# Patient Record
Sex: MALE | Race: WHITE | NOT HISPANIC OR LATINO | Employment: OTHER | URBAN - METROPOLITAN AREA
[De-identification: names, ages, dates, MRNs, and addresses within clinical notes are randomized per-mention and may not be internally consistent; named-entity substitution may affect disease eponyms.]

---

## 2017-01-24 RX ORDER — SIMVASTATIN 40 MG
40 TABLET ORAL DAILY
COMMUNITY
Start: 2013-10-25

## 2017-01-24 RX ORDER — CLONAZEPAM 0.5 MG/1
1.5 TABLET ORAL 2 TIMES DAILY
COMMUNITY
Start: 2013-11-13

## 2017-01-24 RX ORDER — DONEPEZIL HYDROCHLORIDE 10 MG/1
10 TABLET, FILM COATED ORAL DAILY
COMMUNITY
Start: 2013-11-05

## 2017-01-24 RX ORDER — CARBAMAZEPINE 400 MG/1
400 TABLET, EXTENDED RELEASE ORAL 2 TIMES DAILY
COMMUNITY
Start: 2013-11-05

## 2017-01-28 ENCOUNTER — OFFICE VISIT (OUTPATIENT)
Dept: NEUROSURGERY | Facility: CLINIC | Age: 67
End: 2017-01-28

## 2017-01-28 ENCOUNTER — PREP FOR SURGERY (OUTPATIENT)
Dept: NEUROSURGERY | Facility: CLINIC | Age: 67
End: 2017-01-28

## 2017-01-28 VITALS — WEIGHT: 225.4 LBS | HEIGHT: 69 IN | TEMPERATURE: 97.4 F | BODY MASS INDEX: 33.38 KG/M2

## 2017-01-28 DIAGNOSIS — G40.909 SEIZURE DISORDER (HCC): Primary | ICD-10-CM

## 2017-01-28 PROCEDURE — 99243 OFF/OP CNSLTJ NEW/EST LOW 30: CPT | Performed by: NEUROLOGICAL SURGERY

## 2017-01-28 RX ORDER — FAMOTIDINE 10 MG
20 TABLET ORAL
Status: CANCELLED | OUTPATIENT
Start: 2017-01-28

## 2017-01-28 RX ORDER — CHLORHEXIDINE GLUCONATE 4 G/100ML
SOLUTION TOPICAL
Qty: 120 ML | Refills: 0 | Status: SHIPPED | OUTPATIENT
Start: 2017-01-28 | End: 2017-02-16 | Stop reason: HOSPADM

## 2017-01-28 RX ORDER — SODIUM CHLORIDE 0.9 % (FLUSH) 0.9 %
1-10 SYRINGE (ML) INJECTION AS NEEDED
Status: CANCELLED | OUTPATIENT
Start: 2017-01-28

## 2017-01-28 NOTE — MR AVS SNAPSHOT
Harsha Delaney   2017 8:15 AM   Office Visit    Dept Phone:  [phone]   Encounter #:  [id number]    Provider:  Christiano Lopez MD   Department:  [facility]                Your Full Care Plan              Your Updated Medication List          This list is accurate as of: 17  8:39 AM.  Always use your most recent med list.                carBAMazepine  MG 12 hr tablet   Commonly known as:  TEGretol  XR       clonazePAM 0.5 MG tablet   Commonly known as:  KlonoPIN       donepezil 10 MG tablet   Commonly known as:  ARICEPT       simvastatin 40 MG tablet   Commonly known as:  ZOCOR               You Were Diagnosed With        Codes Comments    Seizure disorder    -  Primary ICD-10-CM: G40.909  ICD-9-CM: 345.90       Instructions     None    Patient Instructions History      Upcoming Appointments     Visit Type Date Time Department    NEW PATIENT 2017  8:15 AM [facility]      [employer] Signup     [facility] [employer] allows you to send messages to your doctor, view your test results, renew your prescriptions, schedule appointments, and more. To sign up, go to [employer] and click on the Sign Up Now link in the New User? box. Enter your [employer] Activation Code exactly as it appears below along with the last four digits of your Social Security Number and your Date of Birth () to complete the sign-up process. If you do not sign up before the expiration date, you must request a new code.    [employer] Activation Code: [email or web address]  Expires: 2017  8:36 AM    If you have questions, you can email [email or web address] or call [phone] to talk to our [employer] staff. Remember, [employer] is NOT to be used for urgent needs. For medical emergencies, dial 911.               Other Info from Your Visit           Allergies     No Known Allergies      Reason for Visit     Seizures VAGUS NERVE STIMULATOR  "BATTERY REPLACEMENT      Vital Signs     Temperature Height Weight Body Mass Index Smoking Status       97.4 °F (36.3 °C) 69\" (175.3 cm) 225 lb 6.4 oz (102 kg) 33.29 kg/m2 Never Smoker       Problems and Diagnoses Noted     Seizure disorder    -  Primary        "

## 2017-01-28 NOTE — H&P
Patient: Harsha Delaney  : 1950     Primary Care Provider: Jessie Simons MD     Requesting Provider: As above           History     Chief Complaint:   1. Seizure disorder.  2. Nonfunctioning vagal nerve stimulator.     History of Present Illness: Mr. 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 He's had lifelong seizures. His seizures are apparently complex partial seizures that occur at night. These occur several times a week. They are thought to last for only a minute or 2. He tenses up or stiffens with the seizures. He has had 2 prior craniotomies for temporal lobe resections. A vagal nerve stimulator was placed in 2007 at the [facility]. The battery was changed out in 2012. Apparently the unit is nonfunctional now. Records from his neurologist suggest that lead impedances are high.     Review of Systems   Neurological: Positive for seizures.   All other systems reviewed and are negative.        The patient's past medical history, past surgical history, family history, and social history have been reviewed at length in the electronic medical record.     Past Medical History   Diagnosis Date   • Hypertension    • Seizures    • Shingles      Past Surgical History   Procedure Laterality Date   • Elbow procedure     • Knee cartilage surgery       Right   • Vagus nerve stimulator implantation     • Lumbar discectomy  2008     Microendoscopic far lateral dickectomy Left L3-4 (Bean)   • Posterior lumbar/thoracic spine fusion  10/24/2008     TLIF Left w percutaneous pedicle screw fixation L3/4 (Octaviano)   • Craniotomy  2002     Right temporal lobectomy (Dr. Perez)    • Brain surgery  2007     VNS Implantation (Dr. Perez) [facility]   • Craniotomy  2010     Temporal lobectomy (Dr. Perez)    • Vagus nerve stimulator generator change  2012     (Dr. Perez)    • Cervical discectomy  1989     Dr. Chavira   • Carpal tunnel release   "11/09/2009     Left (Dr. Torres)   • Carpal tunnel release  12/02/2009     Right (Dr. Torres)   • Pacemaker implantation  07/23/2013     Dr. Kent   • Cataract extraction  10/16/2013     Godfrey   • Cataract extraction     • Coronary angioplasty  10/23/2013     Dr. Kent     No family history on file.  Social History     Social History   • Marital status:      Spouse name: N/A   • Number of children: N/A   • Years of education: N/A     Occupational History   • Not on file.     Social History Main Topics   • Smoking status: Never Smoker   • Smokeless tobacco: Not on file   • Alcohol use No   • Drug use: Not on file   • Sexual activity: Not on file     Other Topics Concern   • Not on file     Social History Narrative     No Known Allergies    Physical Exam:        Visit Vitals   • Temp 97.4 °F (36.3 °C)   • Ht 69\" (175.3 cm)   • Wt 225 lb 6.4 oz (102 kg)   • BMI 33.29 kg/m2      CONSTITUTIONAL: Patient is well-nourished, pleasant and appears stated age.  CV: Heart regular rate and rhythm without murmur, rub, or gallop.  PULMONARY: Lungs are clear to ascultation.  MUSCULOSKELETAL:  Neck tenderness to palpation is not observed.   ROM in neck is normal.  His VNS battery sits on his mid left chest fairly low.  A pacemaker is present on the right side.  NEUROLOGICAL:  Orientation, memory, attention span, language function, and cognition have been examined and are intact.  Strength is intact in the upper and lower extremities to direct testing.  Muscle tone is normal throughout.  Station and gait are normal.  Sensation is intact to light touch testing throughout.  Deep tendon reflexes are 2+ and symmetrical. Viridiana's Sign is negative bilaterally. No clonus is elicited.  Coordination is intact.  CRANIAL NERVES:  Cranial Nerve II: Visual fields are full to confrontation.  Cranial Nerve III, IV, and VI: PERRLADC. Extraocular movements are intact. Nystagmus is not present.  Cranial Nerve V: Facial sensation is " intact to light touch.  Cranial Nerve VII: Muscles of facial expression demonstate no weakness or asymmetry.  Cranial Nerve VIII: Hearing is intact to finger rub bilaterally.  Cranial Nerve IX and X: Palate elevates symmetrically.  Cranial Nerve XI: Shoulder shrug is intact bilaterally.  Cranial Nerve XII: Tongue is midline without evidence of atrophy or fasciculation.     Medical Decision Making     Data Review:   I have reviewed records from his neurologist.     Diagnosis:   1. Nonfunctioning vagal nerve stimulator.  2. Complex partial seizure disorder.     Treatment Options:   The patient is here for consideration of vagal nerve stimulator battery change out and revision. Obviously the battery change out is a fairly straightforward measure. The revision of the lead is potentially more complicated with higher risk given the scar tissue present from prior interventions. The goal of surgery would be to get a functioning VNS system in place. Simple battery change out would be a more simple endeavor. If there is lead malfunction then we may need to change out the battery and lead. The battery may also need to be repositioned given its current location. Should we need to change out the lead then there is a higher risk of vagus nerve injury with resultant difficulties or the risk of substantial bleeding due to vascular injury. The nature of the procedure as well as the potential risks, complications, limitations, and alternatives to the procedure were discussed at length with the patient and the patient has agreed to proceed with surgery. Given some of his cardiac issues formal cardiac clearance will be necessary before proceeding with the surgery.          Diagnosis Plan   1. Seizure disorder

## 2017-01-28 NOTE — LETTER
"2017     Jessie Simons MD  [address] [address]    Patient: Harsha Delaney   YOB: 1950   Date of Visit: 2017       Dear Dr. Ronak MD:    Harsha Delaney was in my office today. Below is a copy of my note.    If you have questions, please do not hesitate to call me. I look forward to following Harsha along with you.         Sincerely,        Christiano Lopez MD        CC: MD Joo Fierro MD    Patient: Harsha Delaney  : 1950    Primary Care Provider: Jessie Simons MD    Requesting Provider: As above        History    Chief Complaint:   1.  Seizure disorder.  2.  Nonfunctioning vagal nerve stimulator.    History of Present Illness: Mr. 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  He's had lifelong seizures.  His seizures are apparently complex partial seizures that occur at night.  These occur several times a week.  They are thought to last for only a minute or 2.  He tenses up or stiffens with the seizures.  He has had 2 prior craniotomies for temporal lobe resections.  A vagal nerve stimulator was placed in 2007 at the [facility].  The battery was changed out in 2012.  Apparently the unit is nonfunctional now.  Records from his neurologist suggest that lead impedances are high.    Review of Systems   Neurological: Positive for seizures.   All other systems reviewed and are negative.      The patient's past medical history, past surgical history, family history, and social history have been reviewed at length in the electronic medical record.    Physical Exam:   Visit Vitals   • Temp 97.4 °F (36.3 °C)   • Ht 69\" (175.3 cm)   • Wt 225 lb 6.4 oz (102 kg)   • BMI 33.29 kg/m2     CONSTITUTIONAL: Patient is well-nourished, pleasant and appears stated age.  CV: Heart regular rate and rhythm without murmur, rub, or gallop.  PULMONARY: Lungs are clear to " ascultation.  MUSCULOSKELETAL:  Neck tenderness to palpation is not observed.   ROM in neck is normal.  His VNS battery sits on his mid left chest fairly low.  A pacemaker is present on the right side.  NEUROLOGICAL:  Orientation, memory, attention span, language function, and cognition have been examined and are intact.  Strength is intact in the upper and lower extremities to direct testing.  Muscle tone is normal throughout.  Station and gait are normal.  Sensation is intact to light touch testing throughout.  Deep tendon reflexes are 2+ and symmetrical.  Viridiana's Sign is negative bilaterally. No clonus is elicited.  Coordination is intact.  CRANIAL NERVES:  Cranial Nerve II:  Visual fields are full to confrontation.  Cranial Nerve III, IV, and VI: PERRLADC. Extraocular movements are intact.  Nystagmus is not present.  Cranial Nerve V: Facial sensation is intact to light touch.  Cranial Nerve VII: Muscles of facial expression demonstate no weakness or asymmetry.  Cranial Nerve VIII: Hearing is intact to finger rub bilaterally.  Cranial Nerve IX and X: Palate elevates symmetrically.  Cranial Nerve XI: Shoulder shrug is intact bilaterally.  Cranial Nerve XII: Tongue is midline without evidence of atrophy or fasciculation.    Medical Decision Making    Data Review:   I have reviewed records from his neurologist.    Diagnosis:   1.  Nonfunctioning vagal nerve stimulator.  2.  Complex partial seizure disorder.    Treatment Options:   The patient is here for consideration of vagal nerve stimulator battery change out and revision.  Obviously the battery change out is a fairly straightforward measure.  The revision of the lead is potentially more complicated with higher risk given the scar tissue present from prior interventions.  The goal of surgery would be to get a functioning VNS system in place.  Simple battery change out would be a more simple endeavor.  If there is lead malfunction then we may need to change out  the battery and lead.  The battery may also need to be repositioned given its current location.  Should we need to change out the lead then there is a higher risk of vagus nerve injury with resultant difficulties or the risk of substantial bleeding due to vascular injury.  The nature of the procedure as well as the potential risks, complications, limitations, and alternatives to the procedure were discussed at length with the patient and the patient has agreed to proceed with surgery.  Given some of his cardiac issues formal cardiac clearance will be necessary before proceeding with the surgery.       Diagnosis Plan   1. Seizure disorder             I, Dr. Lopez, personally performed the services described in the documentation, as scribed in my presence, and it is both accurate and complete.  Scribed for Christiano Lopez MD by Rory Gallegos CMA on 01/28/2017 at 8:19 AM

## 2017-02-12 ENCOUNTER — APPOINTMENT (OUTPATIENT)
Dept: PREADMISSION TESTING | Facility: HOSPITAL | Age: 67
End: 2017-02-12

## 2017-02-12 VITALS — WEIGHT: 222.44 LBS | HEIGHT: 69 IN | BODY MASS INDEX: 32.95 KG/M2

## 2017-02-12 LAB
DEPRECATED RDW RBC AUTO: 44.5 FL (ref 37–54)
ERYTHROCYTE [DISTWIDTH] IN BLOOD BY AUTOMATED COUNT: 12.7 % (ref 11.3–14.5)
HCT VFR BLD AUTO: 40.2 % (ref 38.9–50.9)
HGB BLD-MCNC: 14 G/DL (ref 13.1–17.5)
MCH RBC QN AUTO: 33.3 PG (ref 27–31)
MCHC RBC AUTO-ENTMCNC: 34.8 G/DL (ref 32–36)
MCV RBC AUTO: 95.5 FL (ref 80–99)
PLATELET # BLD AUTO: 182 10*3/MM3 (ref 150–450)
PMV BLD AUTO: 8.9 FL (ref 6–12)
POTASSIUM BLD-SCNC: 4.4 MMOL/L (ref 3.5–5.5)
RBC # BLD AUTO: 4.21 10*6/MM3 (ref 4.2–5.76)
WBC NRBC COR # BLD: 3.92 10*3/MM3 (ref 3.5–10.8)

## 2017-02-12 PROCEDURE — 93005 ELECTROCARDIOGRAM TRACING: CPT

## 2017-02-12 PROCEDURE — 36415 COLL VENOUS BLD VENIPUNCTURE: CPT

## 2017-02-12 PROCEDURE — 87081 CULTURE SCREEN ONLY: CPT | Performed by: PHYSICIAN ASSISTANT

## 2017-02-12 PROCEDURE — 93010 ELECTROCARDIOGRAM REPORT: CPT | Performed by: INTERNAL MEDICINE

## 2017-02-12 PROCEDURE — 85027 COMPLETE CBC AUTOMATED: CPT | Performed by: ANESTHESIOLOGY

## 2017-02-12 PROCEDURE — 84132 ASSAY OF SERUM POTASSIUM: CPT | Performed by: ANESTHESIOLOGY

## 2017-02-14 LAB — MRSA SPEC QL CULT: NORMAL

## 2017-02-15 ENCOUNTER — ANESTHESIA EVENT (OUTPATIENT)
Dept: PERIOP | Facility: HOSPITAL | Age: 67
End: 2017-02-15

## 2017-02-15 RX ORDER — FAMOTIDINE 10 MG/ML
20 INJECTION, SOLUTION INTRAVENOUS ONCE
Status: CANCELLED | OUTPATIENT
Start: 2017-02-15 | End: 2017-02-15

## 2017-02-16 ENCOUNTER — ANESTHESIA (OUTPATIENT)
Dept: PERIOP | Facility: HOSPITAL | Age: 67
End: 2017-02-16

## 2017-02-16 ENCOUNTER — HOSPITAL ENCOUNTER (OUTPATIENT)
Facility: HOSPITAL | Age: 67
Discharge: HOME | End: 2017-02-16
Attending: NEUROLOGICAL SURGERY | Admitting: NEUROLOGICAL SURGERY

## 2017-02-16 VITALS
DIASTOLIC BLOOD PRESSURE: 78 MMHG | SYSTOLIC BLOOD PRESSURE: 147 MMHG | RESPIRATION RATE: 16 BRPM | OXYGEN SATURATION: 91 % | TEMPERATURE: 98.9 F | HEART RATE: 73 BPM

## 2017-02-16 DIAGNOSIS — G40.909 SEIZURE DISORDER (HCC): ICD-10-CM

## 2017-02-16 PROCEDURE — C1778 LEAD, NEUROSTIMULATOR: HCPCS | Performed by: NEUROLOGICAL SURGERY

## 2017-02-16 PROCEDURE — 25010000003 CEFAZOLIN IN DEXTROSE 2-4 GM/100ML-% SOLUTION: Performed by: NEUROLOGICAL SURGERY

## 2017-02-16 PROCEDURE — 64569 REVISE/REPL VAGUS N ELTRD: CPT | Performed by: NEUROLOGICAL SURGERY

## 2017-02-16 PROCEDURE — 25010000002 ONDANSETRON PER 1 MG: Performed by: ANESTHESIOLOGY

## 2017-02-16 PROCEDURE — 25010000002 PROPOFOL 10 MG/ML EMULSION: Performed by: NURSE ANESTHETIST, CERTIFIED REGISTERED

## 2017-02-16 PROCEDURE — 25010000003 CEFAZOLIN PER 500 MG: Performed by: NEUROLOGICAL SURGERY

## 2017-02-16 PROCEDURE — C1767 GENERATOR, NEURO NON-RECHARG: HCPCS | Performed by: NEUROLOGICAL SURGERY

## 2017-02-16 PROCEDURE — 25010000002 ONDANSETRON PER 1 MG: Performed by: NURSE ANESTHETIST, CERTIFIED REGISTERED

## 2017-02-16 PROCEDURE — 25010000002 FENTANYL CITRATE (PF) 100 MCG/2ML SOLUTION: Performed by: NURSE ANESTHETIST, CERTIFIED REGISTERED

## 2017-02-16 PROCEDURE — 25010000002 MIDAZOLAM PER 1 MG: Performed by: NURSE ANESTHETIST, CERTIFIED REGISTERED

## 2017-02-16 PROCEDURE — 25010000002 HEPARIN (PORCINE) PER 1000 UNITS: Performed by: NEUROLOGICAL SURGERY

## 2017-02-16 PROCEDURE — 25010000002 NEOSTIGMINE 10 MG/10ML SOLUTION: Performed by: NURSE ANESTHETIST, CERTIFIED REGISTERED

## 2017-02-16 PROCEDURE — 61885 INSRT/REDO NEUROSTIM 1 ARRAY: CPT | Performed by: NEUROLOGICAL SURGERY

## 2017-02-16 PROCEDURE — 25010000002 DEXAMETHASONE PER 1 MG: Performed by: NURSE ANESTHETIST, CERTIFIED REGISTERED

## 2017-02-16 DEVICE — IMPLANTABLE PULSE GENERATOR
Type: IMPLANTABLE DEVICE | Status: FUNCTIONAL
Brand: VNS THERAPY® ASPIREHC® MODEL 105 GENERATOR

## 2017-02-16 DEVICE — IMPLANTABLE LEAD
Type: IMPLANTABLE DEVICE | Status: FUNCTIONAL
Brand: VNS THERAPY® PERENNIAFLEX® MODEL 304 LEAD

## 2017-02-16 RX ORDER — PROMETHAZINE HYDROCHLORIDE 25 MG/1
25 SUPPOSITORY RECTAL ONCE AS NEEDED
Status: DISCONTINUED | OUTPATIENT
Start: 2017-02-16 | End: 2017-02-16 | Stop reason: HOSPADM

## 2017-02-16 RX ORDER — FENTANYL CITRATE 50 UG/ML
INJECTION, SOLUTION INTRAMUSCULAR; INTRAVENOUS AS NEEDED
Status: DISCONTINUED | OUTPATIENT
Start: 2017-02-16 | End: 2017-02-16 | Stop reason: SURG

## 2017-02-16 RX ORDER — FENTANYL CITRATE 50 UG/ML
50 INJECTION, SOLUTION INTRAMUSCULAR; INTRAVENOUS
Status: DISCONTINUED | OUTPATIENT
Start: 2017-02-16 | End: 2017-02-16 | Stop reason: HOSPADM

## 2017-02-16 RX ORDER — GLYCOPYRROLATE 0.2 MG/ML
INJECTION INTRAMUSCULAR; INTRAVENOUS AS NEEDED
Status: DISCONTINUED | OUTPATIENT
Start: 2017-02-16 | End: 2017-02-16 | Stop reason: SURG

## 2017-02-16 RX ORDER — LIDOCAINE HYDROCHLORIDE 10 MG/ML
1 INJECTION, SOLUTION EPIDURAL; INFILTRATION; INTRACAUDAL; PERINEURAL ONCE
Status: COMPLETED | OUTPATIENT
Start: 2017-02-16 | End: 2017-02-16

## 2017-02-16 RX ORDER — NEOSTIGMINE METHYLSULFATE 1 MG/ML
INJECTION, SOLUTION INTRAVENOUS AS NEEDED
Status: DISCONTINUED | OUTPATIENT
Start: 2017-02-16 | End: 2017-02-16 | Stop reason: SURG

## 2017-02-16 RX ORDER — MAGNESIUM HYDROXIDE 1200 MG/15ML
LIQUID ORAL AS NEEDED
Status: DISCONTINUED | OUTPATIENT
Start: 2017-02-16 | End: 2017-02-16 | Stop reason: HOSPADM

## 2017-02-16 RX ORDER — SODIUM CHLORIDE, SODIUM LACTATE, POTASSIUM CHLORIDE, CALCIUM CHLORIDE 600; 310; 30; 20 MG/100ML; MG/100ML; MG/100ML; MG/100ML
9 INJECTION, SOLUTION INTRAVENOUS CONTINUOUS
Status: DISCONTINUED | OUTPATIENT
Start: 2017-02-16 | End: 2017-02-16 | Stop reason: HOSPADM

## 2017-02-16 RX ORDER — LABETALOL HYDROCHLORIDE 5 MG/ML
5 INJECTION, SOLUTION INTRAVENOUS
Status: DISCONTINUED | OUTPATIENT
Start: 2017-02-16 | End: 2017-02-16 | Stop reason: HOSPADM

## 2017-02-16 RX ORDER — PROMETHAZINE HYDROCHLORIDE 25 MG/1
25 TABLET ORAL ONCE AS NEEDED
Status: DISCONTINUED | OUTPATIENT
Start: 2017-02-16 | End: 2017-02-16 | Stop reason: HOSPADM

## 2017-02-16 RX ORDER — ONDANSETRON 2 MG/ML
4 INJECTION INTRAMUSCULAR; INTRAVENOUS ONCE
Status: COMPLETED | OUTPATIENT
Start: 2017-02-16 | End: 2017-02-16

## 2017-02-16 RX ORDER — DEXAMETHASONE SODIUM PHOSPHATE 4 MG/ML
INJECTION, SOLUTION INTRA-ARTICULAR; INTRALESIONAL; INTRAMUSCULAR; INTRAVENOUS; SOFT TISSUE AS NEEDED
Status: DISCONTINUED | OUTPATIENT
Start: 2017-02-16 | End: 2017-02-16 | Stop reason: SURG

## 2017-02-16 RX ORDER — SODIUM CHLORIDE 0.9 % (FLUSH) 0.9 %
1-10 SYRINGE (ML) INJECTION AS NEEDED
Status: DISCONTINUED | OUTPATIENT
Start: 2017-02-16 | End: 2017-02-16 | Stop reason: HOSPADM

## 2017-02-16 RX ORDER — PROPOFOL 10 MG/ML
VIAL (ML) INTRAVENOUS AS NEEDED
Status: DISCONTINUED | OUTPATIENT
Start: 2017-02-16 | End: 2017-02-16 | Stop reason: SURG

## 2017-02-16 RX ORDER — MIDAZOLAM HYDROCHLORIDE 1 MG/ML
INJECTION INTRAMUSCULAR; INTRAVENOUS AS NEEDED
Status: DISCONTINUED | OUTPATIENT
Start: 2017-02-16 | End: 2017-02-16 | Stop reason: SURG

## 2017-02-16 RX ORDER — PROMETHAZINE HYDROCHLORIDE 25 MG/ML
6.25 INJECTION, SOLUTION INTRAMUSCULAR; INTRAVENOUS ONCE AS NEEDED
Status: DISCONTINUED | OUTPATIENT
Start: 2017-02-16 | End: 2017-02-16 | Stop reason: HOSPADM

## 2017-02-16 RX ORDER — HEPARIN SODIUM 1000 [USP'U]/ML
INJECTION, SOLUTION INTRAVENOUS; SUBCUTANEOUS AS NEEDED
Status: DISCONTINUED | OUTPATIENT
Start: 2017-02-16 | End: 2017-02-16 | Stop reason: HOSPADM

## 2017-02-16 RX ORDER — LIDOCAINE HYDROCHLORIDE 10 MG/ML
INJECTION, SOLUTION EPIDURAL; INFILTRATION; INTRACAUDAL; PERINEURAL AS NEEDED
Status: DISCONTINUED | OUTPATIENT
Start: 2017-02-16 | End: 2017-02-16 | Stop reason: SURG

## 2017-02-16 RX ORDER — HYDROCODONE BITARTRATE AND ACETAMINOPHEN 7.5; 325 MG/1; MG/1
1 TABLET ORAL 3 TIMES DAILY PRN
Qty: 30 TABLET | Refills: 0 | Status: SHIPPED | OUTPATIENT
Start: 2017-02-16 | End: 2021-02-25

## 2017-02-16 RX ORDER — HYDROCODONE BITARTRATE AND ACETAMINOPHEN 5; 325 MG/1; MG/1
1 TABLET ORAL ONCE AS NEEDED
Status: DISCONTINUED | OUTPATIENT
Start: 2017-02-16 | End: 2017-02-16

## 2017-02-16 RX ORDER — FAMOTIDINE 20 MG/1
20 TABLET, FILM COATED ORAL ONCE
Status: COMPLETED | OUTPATIENT
Start: 2017-02-16 | End: 2017-02-16

## 2017-02-16 RX ORDER — ONDANSETRON 2 MG/ML
INJECTION INTRAMUSCULAR; INTRAVENOUS AS NEEDED
Status: DISCONTINUED | OUTPATIENT
Start: 2017-02-16 | End: 2017-02-16 | Stop reason: SURG

## 2017-02-16 RX ORDER — MEPERIDINE HYDROCHLORIDE 25 MG/ML
12.5 INJECTION INTRAMUSCULAR; INTRAVENOUS; SUBCUTANEOUS
Status: DISCONTINUED | OUTPATIENT
Start: 2017-02-16 | End: 2017-02-16 | Stop reason: HOSPADM

## 2017-02-16 RX ORDER — ROCURONIUM BROMIDE 10 MG/ML
INJECTION, SOLUTION INTRAVENOUS AS NEEDED
Status: DISCONTINUED | OUTPATIENT
Start: 2017-02-16 | End: 2017-02-16 | Stop reason: SURG

## 2017-02-16 RX ORDER — CEFAZOLIN SODIUM 1 G/3ML
INJECTION, POWDER, FOR SOLUTION INTRAMUSCULAR; INTRAVENOUS AS NEEDED
Status: DISCONTINUED | OUTPATIENT
Start: 2017-02-16 | End: 2017-02-16 | Stop reason: HOSPADM

## 2017-02-16 RX ORDER — CEFAZOLIN SODIUM 2 G/100ML
2 INJECTION, SOLUTION INTRAVENOUS ONCE
Status: COMPLETED | OUTPATIENT
Start: 2017-02-16 | End: 2017-02-16

## 2017-02-16 RX ORDER — LIDOCAINE HYDROCHLORIDE 10 MG/ML
INJECTION, SOLUTION INFILTRATION; PERINEURAL AS NEEDED
Status: DISCONTINUED | OUTPATIENT
Start: 2017-02-16 | End: 2017-02-16 | Stop reason: HOSPADM

## 2017-02-16 RX ADMIN — ONDANSETRON 4 MG: 2 INJECTION INTRAMUSCULAR; INTRAVENOUS at 13:42

## 2017-02-16 RX ADMIN — LIDOCAINE HYDROCHLORIDE 0.2 ML: 10 INJECTION, SOLUTION EPIDURAL; INFILTRATION; INTRACAUDAL; PERINEURAL at 07:37

## 2017-02-16 RX ADMIN — LIDOCAINE HYDROCHLORIDE 50 MG: 10 INJECTION, SOLUTION EPIDURAL; INFILTRATION; INTRACAUDAL; PERINEURAL at 09:08

## 2017-02-16 RX ADMIN — FAMOTIDINE 20 MG: 20 TABLET ORAL at 07:51

## 2017-02-16 RX ADMIN — SODIUM CHLORIDE, POTASSIUM CHLORIDE, SODIUM LACTATE AND CALCIUM CHLORIDE 9 ML/HR: 600; 310; 30; 20 INJECTION, SOLUTION INTRAVENOUS at 07:37

## 2017-02-16 RX ADMIN — MIDAZOLAM HYDROCHLORIDE 2 MG: 1 INJECTION, SOLUTION INTRAMUSCULAR; INTRAVENOUS at 09:04

## 2017-02-16 RX ADMIN — SODIUM CHLORIDE, POTASSIUM CHLORIDE, SODIUM LACTATE AND CALCIUM CHLORIDE: 600; 310; 30; 20 INJECTION, SOLUTION INTRAVENOUS at 11:10

## 2017-02-16 RX ADMIN — FENTANYL CITRATE 50 MCG: 50 INJECTION, SOLUTION INTRAMUSCULAR; INTRAVENOUS at 12:02

## 2017-02-16 RX ADMIN — ROCURONIUM BROMIDE 20 MG: 10 INJECTION INTRAVENOUS at 09:08

## 2017-02-16 RX ADMIN — ONDANSETRON 4 MG: 2 INJECTION INTRAMUSCULAR; INTRAVENOUS at 09:51

## 2017-02-16 RX ADMIN — ROBINUL 0.2 MG: 0.2 INJECTION INTRAMUSCULAR; INTRAVENOUS at 09:19

## 2017-02-16 RX ADMIN — FENTANYL CITRATE 100 MCG: 50 INJECTION, SOLUTION INTRAMUSCULAR; INTRAVENOUS at 09:08

## 2017-02-16 RX ADMIN — ROBINUL 0.2 MG: 0.2 INJECTION INTRAMUSCULAR; INTRAVENOUS at 09:17

## 2017-02-16 RX ADMIN — DEXAMETHASONE SODIUM PHOSPHATE 8 MG: 4 INJECTION, SOLUTION INTRAMUSCULAR; INTRAVENOUS at 09:08

## 2017-02-16 RX ADMIN — NEOSTIGMINE METHYLSULFATE 2.5 MG: 1 INJECTION, SOLUTION INTRAVENOUS at 10:47

## 2017-02-16 RX ADMIN — PROPOFOL 150 MG: 10 INJECTION, EMULSION INTRAVENOUS at 09:08

## 2017-02-16 RX ADMIN — FENTANYL CITRATE 50 MCG: 50 INJECTION, SOLUTION INTRAMUSCULAR; INTRAVENOUS at 11:47

## 2017-02-16 RX ADMIN — HYDROCODONE BITARTRATE AND ACETAMINOPHEN 1 TABLET: 5; 325 TABLET ORAL at 13:21

## 2017-02-16 RX ADMIN — SODIUM CHLORIDE, POTASSIUM CHLORIDE, SODIUM LACTATE AND CALCIUM CHLORIDE: 600; 310; 30; 20 INJECTION, SOLUTION INTRAVENOUS at 09:04

## 2017-02-16 RX ADMIN — CEFAZOLIN SODIUM 2 G: 2 INJECTION, SOLUTION INTRAVENOUS at 09:04

## 2017-02-16 RX ADMIN — ROBINUL 0.4 MG: 0.2 INJECTION INTRAMUSCULAR; INTRAVENOUS at 10:47

## 2017-02-16 NOTE — OP NOTE
DATE OF PROCEDURE:  02/16/2017    PREOPERATIVE DIAGNOSES:   1.  Nonfunctioning vagal nerve stimulator.   2.  Seizure disorder.     POSTOPERATIVE DIAGNOSES:  1.  Nonfunctioning vagal nerve stimulator.   2.  Seizure disorder.     PROCEDURES PERFORMED:  1.  Complete removal of nonfunctioning vagal nerve stimulator system.   2.  Placement of a complete new vagal nerve stimulator lead and generator.     SURGEON: Christiano Lopez MD     ASSISTANT: Yara Lundberg PA-C    ANESTHESIA: General endotracheal.     ESTIMATED BLOOD LOSS: Minimal.     SPECIMEN: None.     COMPLICATIONS: None.     INDICATIONS: The patient is a 66-year-old gentleman with a long-standing seizure disorder. He has had a vagal nerve stimulator in place to help ameliorate his symptoms. That has been nonfunctional and he has been reading high lead impedance.  It is unclear whether the lead is actually dysfunctional or whether the generator was running low and creating difficulties as such.  He presents at this time for vagal nerve stimulator revision, battery change out and revision as necessary. The nature of the procedure, as well as the potential risks, complications and limitations were well outlined to the patient and he has agreed to proceed with surgery.     DESCRIPTION OF PROCEDURE: The patient was brought to the operating room and placed on the operating table in the supine position. General endotracheal anesthesia was achieved. His left chest wall and neck were prepared and draped in the usual fashion.  He did receive preoperative antibiotics.  His generator incision on the chest wall was opened and the generator was removed.  The unit was interrogated.  The battery was not completely dead and were able to obtain settings from it.  The generator was then disconnected from the lead and a new 105 generator was brought into the field and affixed to the lead.  The system was re-interrogated and unfortunately lead impedances remained quite high.  The  new generator was taken off the field.  The cervical incision was opened. This was a small transverse incision left of midline. Underlying subcutaneous tissues were undermined. The platysma was divided longitudinally.  I spent a good deal of time pursuing a very tedious dissection to follow the lead down. The lead was scarred to the undersurface of the internal jugular vein. Extensive amount of time and neurolysis was performed to free up the old lead and identify the vagus nerve.  The old lead was carefully removed from the nerve. I did expose a new segment approximately 2 cm in length of new nerve in a cephalad. The lead passer was utilized to pass the new 102-lead from the neck to the chest wall pocket. The lead tether was affixed to some of the previously utilized area of  vagus nerve. The anode and cathode were placed on virgin areas of nerve.  Strain relief bends and loops were placed into the lead and secured in place with rubber bumpers via 3-0 silk sutures. The platysma was reapproximated in interrupted fashion with 3-0 Vicryl suture. The lead was then affixed with a new 105 generator and impedances were now in a normal range. The generator was placed into the pocket and secured in place with a 3-0 silk suture. Subcutaneous tissues were closed in layers with 3-0 Vicryl suture. The unit was interrogated once again. Impedances were appropriate. All was in order. The generator was programmed with very low settings, given that the current system was probably not functioning. The skin at each incision was closed in a running subcuticular fashion with 3-0 Vicryl suture. A dermal sealant was applied. The patient was subsequently extubated and taken to the recovery room in satisfactory condition. There were no overt intraoperative complications.       MD CHANO Chirinos/marcie  DD: 02/16/2017 10:59:40  DT: 02/16/2017 12:16:26  Voice Rec. ID #[id number]  Voice Original ID #[id number]  Doc ID #[id number]  Rev. #0  cc:

## 2017-02-16 NOTE — ANESTHESIA PREPROCEDURE EVALUATION
Anesthesia Evaluation        Airway   Mallampati: I  TM distance: <3 FB  Neck ROM: full  no difficulty expected  Dental - normal exam     Pulmonary - normal exam   (+) shortness of breath,   Cardiovascular - normal exam    (+) hypertension,       Neuro/Psych  (+) seizures,    GI/Hepatic/Renal/Endo      Musculoskeletal     Abdominal  - normal exam    Bowel sounds: normal.   Substance History      OB/GYN          Other                                    Anesthesia Plan    ASA 3     general     intravenous induction   Anesthetic plan and risks discussed with patient.

## 2017-02-16 NOTE — ANESTHESIA PROCEDURE NOTES
Airway  Urgency: elective    Airway not difficult    General Information and Staff    Patient location during procedure: OR  CRNA: KAREN CARABALLO    Indications and Patient Condition  Indications for airway management: airway protection    Preoxygenated: yes  MILS not maintained throughout  Mask difficulty assessment: 1 - vent by mask    Final Airway Details  Final airway type: endotracheal airway      Successful airway: ETT  Cuffed: yes   Successful intubation technique: direct laryngoscopy  Endotracheal tube insertion site: oral  Blade: Narda  Blade size: #3  ETT size: 7.5 mm  Cormack-Lehane Classification: grade I - full view of glottis  Placement verified by: chest auscultation and capnometry   Measured from: lips  ETT to lips (cm): 20  Number of attempts at approach: 1    Additional Comments  Negative epigastric sounds, Breath sound equal bilaterally with symmetric chest rise and fall

## 2017-02-16 NOTE — H&P (VIEW-ONLY)
Patient: Harsha Delaney  : 1950     Primary Care Provider: Jessie Simons MD     Requesting Provider: As above           History     Chief Complaint:   1. Seizure disorder.  2. Nonfunctioning vagal nerve stimulator.     History of Present Illness: Mr. 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 His seizures are apparently complex partial seizures that occur at night. These occur several times a week. They are thought to last for only a minute or 2. He tenses up or stiffens with the seizures. He has had 2 prior craniotomies for temporal lobe resections. A vagal nerve stimulator was placed in 2007 at the [facility]. The battery was changed out in 2012. Apparently the unit is nonfunctional now. Records from his neurologist suggest that lead impedances are high.     Review of Systems   Neurological: Positive for seizures.   All other systems reviewed and are negative.        The patient's past medical history, past surgical history, family history, and social history have been reviewed at length in the electronic medical record.     Past Medical History   Diagnosis Date   • Hypertension    • Seizures    • Shingles      Past Surgical History   Procedure Laterality Date   • Elbow procedure     • Knee cartilage surgery       Right   • Vagus nerve stimulator implantation     • Lumbar discectomy  2008     Microendoscopic far lateral dickectomy Left L3-4 (Bean)   • Posterior lumbar/thoracic spine fusion  10/24/2008     TLIF Left w percutaneous pedicle screw fixation L3/4 (Octaviano)   • Craniotomy  2002     Right temporal lobectomy (Dr. Perez)    • Brain surgery  2007     VNS Implantation (Dr. Perez) [facility]   • Craniotomy  2010     Temporal lobectomy (Dr. Perez)    • Vagus nerve stimulator generator change  2012     (Dr. Perez)    • Cervical discectomy  1989     Dr. Chavira   • Carpal tunnel release   "11/09/2009     Left (Dr. Torres)   • Carpal tunnel release  12/02/2009     Right (Dr. Torres)   • Pacemaker implantation  07/23/2013     Dr. Kent   • Cataract extraction  10/16/2013     Godfrey   • Cataract extraction     • Coronary angioplasty  10/23/2013     Dr. Kent     No family history on file.  Social History     Social History   • Marital status:      Spouse name: N/A   • Number of children: N/A   • Years of education: N/A     Occupational History   • Not on file.     Social History Main Topics   • Smoking status: Never Smoker   • Smokeless tobacco: Not on file   • Alcohol use No   • Drug use: Not on file   • Sexual activity: Not on file     Other Topics Concern   • Not on file     Social History Narrative     No Known Allergies    Physical Exam:        Visit Vitals   • Temp 97.4 °F (36.3 °C)   • Ht 69\" (175.3 cm)   • Wt 225 lb 6.4 oz (102 kg)   • BMI 33.29 kg/m2      CONSTITUTIONAL: Patient is well-nourished, pleasant and appears stated age.  CV: Heart regular rate and rhythm without murmur, rub, or gallop.  PULMONARY: Lungs are clear to ascultation.  MUSCULOSKELETAL:  Neck tenderness to palpation is not observed.   ROM in neck is normal.  His VNS battery sits on his mid left chest fairly low.  A pacemaker is present on the right side.  NEUROLOGICAL:  Orientation, memory, attention span, language function, and cognition have been examined and are intact.  Strength is intact in the upper and lower extremities to direct testing.  Muscle tone is normal throughout.  Station and gait are normal.  Sensation is intact to light touch testing throughout.  Deep tendon reflexes are 2+ and symmetrical. Viridiana's Sign is negative bilaterally. No clonus is elicited.  Coordination is intact.  CRANIAL NERVES:  Cranial Nerve II: Visual fields are full to confrontation.  Cranial Nerve III, IV, and VI: PERRLADC. Extraocular movements are intact. Nystagmus is not present.  Cranial Nerve V: Facial sensation is " intact to light touch.  Cranial Nerve VII: Muscles of facial expression demonstate no weakness or asymmetry.  Cranial Nerve VIII: Hearing is intact to finger rub bilaterally.  Cranial Nerve IX and X: Palate elevates symmetrically.  Cranial Nerve XI: Shoulder shrug is intact bilaterally.  Cranial Nerve XII: Tongue is midline without evidence of atrophy or fasciculation.     Medical Decision Making     Data Review:   I have reviewed records from his neurologist.     Diagnosis:   1. Nonfunctioning vagal nerve stimulator.  2. Complex partial seizure disorder.     Treatment Options:   The patient is here for consideration of vagal nerve stimulator battery change out and revision. Obviously the battery change out is a fairly straightforward measure. The revision of the lead is potentially more complicated with higher risk given the scar tissue present from prior interventions. The goal of surgery would be to get a functioning VNS system in place. Simple battery change out would be a more simple endeavor. If there is lead malfunction then we may need to change out the battery and lead. The battery may also need to be repositioned given its current location. Should we need to change out the lead then there is a higher risk of vagus nerve injury with resultant difficulties or the risk of substantial bleeding due to vascular injury. The nature of the procedure as well as the potential risks, complications, limitations, and alternatives to the procedure were discussed at length with the patient and the patient has agreed to proceed with surgery. Given some of his cardiac issues formal cardiac clearance will be necessary before proceeding with the surgery.          Diagnosis Plan   1. Seizure disorder

## 2017-02-16 NOTE — BRIEF OP NOTE
VAGUS NERVE STIMULATOR IMPLANTATION  Procedure Note    Harsha Delaney  2/16/2017    Pre-op Diagnosis:   Seizure disorder [G40.909]    Post-op Diagnosis:     Post-Op Diagnosis Codes:     * Seizure disorder [G40.909]    Procedure/CPT® Codes:      Procedure(s):  VAGUS NERVE STIMULATOR IMPLANTATION  Removal of nonfunctioning entire vagal nerve stimulator    Surgeon(s):  Christiano Lopez MD    Anesthesia: General    Staff:   Circulator: Kaylee Elder RN; Amaris Walter RN  Scrub Person: Leola Thakur  Nursing Assistant: Celi Barron  Assistant: Yara Lundberg PA-C  Orientee: Columba Ledesma    Estimated Blood Loss: 25 mL    Specimens:                * No specimens in log *      Drains:           Findings: Nonfunctioning lead    Complications: None      Christiano Lopez MD     Date: 2/16/2017  Time: 11:01 AM

## 2017-02-16 NOTE — INTERVAL H&P NOTE
Pre-Op H&P (See Recent Office Note Attached)    Chief complaint: Seizures    Review of Systems:  General ROS:  no fever, chills, rashes, No change since last office visit  Cardiovascular ROS: no chest pain or dyspnea on exertion.  Follows with Dr. Kent.  +cardiac clearance.  PPM int today with < 1% use  Respiratory ROS: no cough, shortness of breath, or wheezing  Neuro:  Last seizure Monday/Tuesday    Immunization History:   Influenza:  Yes 2016  Pneumococcal:  Yes < 5 years  Tetanus:  unknown    Vital Signs:  Visit Vitals   • BP (!) 158/102 (BP Location: Right arm, Patient Position: Lying)   • Pulse 50   • Temp 97.5 °F (36.4 °C) (Temporal Artery )   • Resp 18   • SpO2 97%       Physical Exam:    CV:  S1S2 regular rate and rhythm, no murmur               Resp:  Clear to auscultation; respirations regular, even and unlabored    Results Review:    I reviewed the patient's new clinical results.    Cancer Staging (if applicable)  Cancer Patient: __ yes x__no __unknown; If yes, clinical stage T:__ N:__M:__, stage group    Mei Ocasio, APRN  2/16/2017 8:16 AM

## 2017-02-18 NOTE — ANESTHESIA POSTPROCEDURE EVALUATION
Patient: Harsha Delaney    Procedure Summary     Date Anesthesia Start Anesthesia Stop Room / Location    02/16/17 0904 1109 [facility] / [facility]       Procedure Diagnosis Surgeon Provider    VAGUS NERVE STIMULATOR IMPLANTATION (Left Chest) Seizure disorder  (Seizure disorder [G40.909]) MD Vance Hoyos MD          Anesthesia Type: general  Last vitals  BP      Temp      Pulse     Resp      SpO2        Post Anesthesia Care and Evaluation    Patient location during evaluation: PACU  Patient participation: complete - patient participated  Level of consciousness: awake and alert  Pain score: 0  Pain management: adequate  Airway patency: patent  Anesthetic complications: No anesthetic complications  PONV Status: none  Cardiovascular status: hemodynamically stable and acceptable  Respiratory status: nonlabored ventilation, acceptable and nasal cannula  Hydration status: acceptable

## 2017-02-22 ENCOUNTER — TELEPHONE (OUTPATIENT)
Dept: NEUROSURGERY | Facility: CLINIC | Age: 67
End: 2017-02-22

## 2017-02-22 ENCOUNTER — OFFICE VISIT (OUTPATIENT)
Dept: NEUROSURGERY | Facility: CLINIC | Age: 67
End: 2017-02-22

## 2017-02-22 VITALS
HEIGHT: 69 IN | TEMPERATURE: 98.2 F | DIASTOLIC BLOOD PRESSURE: 72 MMHG | BODY MASS INDEX: 32.88 KG/M2 | SYSTOLIC BLOOD PRESSURE: 136 MMHG | WEIGHT: 222 LBS

## 2017-02-22 DIAGNOSIS — Z51.89 VISIT FOR WOUND CHECK: Primary | ICD-10-CM

## 2017-02-22 DIAGNOSIS — G40.909 SEIZURE DISORDER (HCC): ICD-10-CM

## 2017-02-22 PROCEDURE — 99024 POSTOP FOLLOW-UP VISIT: CPT | Performed by: PHYSICIAN ASSISTANT

## 2017-02-22 NOTE — TELEPHONE ENCOUNTER
Provider: John  Caller: Harsha    Phone #:    Surgery:  VAGUS NERVE STIMULATOR IMPLANTATION  Surgery Date:  2/16/17  Last visit:  1/28/17     ANALI:         Reason for call:       Pt states that he has a few knots at his incision site, wants to know what he needs to do

## 2017-02-22 NOTE — PROGRESS NOTES
"Patient: Harsha Delaney  : 1950  Chart #: [id number]    Date of Service: 2017    CHIEF COMPLAINT: wound check    History of Present Illness Mr. Delaney is a 66-year-old gentleman who is 6 days status post complete removal of a nonfunctioning vagal nerve stimulator system and placement of the new nerve stimulator lead and generator.  Surgery was without complication. Patient walked in to the clinic today for a wound check. He felt like he had some swelling and was concerned about infection.  He denies drainage. He has no pain.  No fevers.     The following portions of the patient's history were reviewed and updated as appropriate: allergies, current medications, past family history, past medical history, past social history, past surgical history and problem list.    Review of Systems   All other systems reviewed and are negative.      Objective   Vital Signs: Blood pressure 136/72, temperature 98.2 °F (36.8 °C), temperature source Temporal Artery , height 69\" (175.3 cm), weight 222 lb (101 kg).  Physical Exam   Constitutional: He appears well-developed and well-nourished. No distress.   Skin:   Patient is concerned about the neck incision.  The wound edges are well approximated.  No erythema or induration. No swelling. No active drainage. He feels a small knot about the incision which I explained is normal and due to the closure.      The chest incision is healing nicely as well.    Nursing note and vitals reviewed.      Assessment/Plan   Medical Decision Making: Mr. Delaney is 6 days status post vagal nerve stimulator change out and replacement. I reassured him that his incision looks normal.  He will call if he has significant swelling, drainage, or fever.  Otherwise he will keep his scheduled follow up in a couple weeks.  I also discussed further wound care instructions.              Ida Hernandes PA-C  Patient Care Team:  Jessie Simons MD as PCP - General (Family Medicine)  Edith MANLEY" MD Renu as Referring Physician (Neurology)  Joo Kent MD as Consulting Physician (Cardiology)

## 2017-03-08 ENCOUNTER — OFFICE VISIT (OUTPATIENT)
Dept: NEUROSURGERY | Facility: CLINIC | Age: 67
End: 2017-03-08

## 2017-03-08 VITALS
TEMPERATURE: 97.1 F | HEIGHT: 69 IN | SYSTOLIC BLOOD PRESSURE: 126 MMHG | WEIGHT: 216 LBS | DIASTOLIC BLOOD PRESSURE: 72 MMHG | BODY MASS INDEX: 31.99 KG/M2

## 2017-03-08 DIAGNOSIS — G40.909 SEIZURE DISORDER (HCC): Primary | ICD-10-CM

## 2017-03-08 PROCEDURE — 99024 POSTOP FOLLOW-UP VISIT: CPT | Performed by: PHYSICIAN ASSISTANT

## 2017-03-08 NOTE — PROGRESS NOTES
Patient: Harsha Delaney  : 1950  Chart #: [id number]    Date of Service: 2017    CHIEF COMPLAINT: 1.  Nonfunctioning vagal nerve stimulator  2.  Seizure disorder    History of Present Illness Mr. Delaney is a 66-year-old retired gentleman with a long-standing seizure disorder.  He had a vagal nerves stimulator placed to help ameliorate his symptoms.  This was found to be nonfunctional and he had been reading high lead impedance.  It was unclear whether the lead was actually dysfunctional or whether the generator was running low and creating difficulties.  As such on 2017 patient underwent surgical intervention for vagal nerve stimulator revision.  As it turns out, the battery was not completely dead.  The generator was disconnected and the leads were connected to a new generator and unfortunately the impedances remained quite high. Therefore a completely new vagal nerve stimulator lead and generator was placed.  Surgery was without overt intraoperative complication.  Mr. Delaney came in a few days after surgery for a wound check because he was afraid his incision was infected.  Everything was found to be normal.  Today his incisions are healed up nicely.  He saw his neurologist yesterday and had his device turned on and set appropriately.    The following portions of the patient's history were reviewed and updated as appropriate: allergies, current medications, past family history, past medical history, past social history, past surgical history and problem list.    Review of Systems   Constitutional: Negative for activity change, appetite change, chills, diaphoresis, fatigue, fever and unexpected weight change.   HENT: Negative for congestion, dental problem, drooling, ear discharge, ear pain, facial swelling, hearing loss, mouth sores, nosebleeds, postnasal drip, rhinorrhea, sinus pressure, sneezing, sore throat, tinnitus, trouble swallowing and voice change.    Eyes: Negative for photophobia, pain,  "discharge, redness, itching and visual disturbance.   Respiratory: Negative for apnea, cough, choking, chest tightness, shortness of breath, wheezing and stridor.    Cardiovascular: Negative for chest pain, palpitations and leg swelling.   Gastrointestinal: Negative for abdominal distention, abdominal pain, anal bleeding, blood in stool, constipation, diarrhea, nausea, rectal pain and vomiting.   Endocrine: Negative for cold intolerance, heat intolerance, polydipsia, polyphagia and polyuria.   Genitourinary: Negative for decreased urine volume, difficulty urinating, dysuria, enuresis, flank pain, frequency, genital sores, hematuria and urgency.   Musculoskeletal: Negative for arthralgias, back pain, gait problem, joint swelling, myalgias, neck pain and neck stiffness.   Skin: Negative for color change, pallor, rash and wound.   Allergic/Immunologic: Negative for environmental allergies, food allergies and immunocompromised state.   Neurological: Negative for dizziness, tremors, seizures, syncope, facial asymmetry, speech difficulty, weakness, light-headedness, numbness and headaches.   Hematological: Negative for adenopathy. Does not bruise/bleed easily.   Psychiatric/Behavioral: Negative for agitation, behavioral problems, confusion, decreased concentration, dysphoric mood, hallucinations, self-injury, sleep disturbance and suicidal ideas. The patient is not nervous/anxious and is not hyperactive.    All other systems reviewed and are negative.      Objective   Vital Signs: Blood pressure 126/72, temperature 97.1 °F (36.2 °C), height 69\" (175.3 cm), weight 216 lb (98 kg).  Physical Exam   Constitutional: He appears well-developed and well-nourished. No distress.   HENT:   Head: Normocephalic and atraumatic.   Skin:   Neck and chest incisions have healed nicely.  I removed the remaining Dermabond that was peeling off his neck incision.  There is no warmth erythema or induration.  Patient exhibits good active range " of motion of the neck and arm.   Psychiatric: He has a normal mood and affect. His behavior is normal. Thought content normal.   Nursing note and vitals reviewed.      Assessment/Plan   Medical Decision Making: Mr. Delaney is 3 weeks status post complete removal of nonfunctioning vagal nerve stimulator system and replacement of a new stimulator lead and generator.  He saw his neurologist yesterday and had the device turned on and set appropriately.  His incisions have healed up nicely.  He will call us if he has any issue moving forward otherwise he will continue following up with his neurologist.             Ida Hernandes PA-C  Patient Care Team:  Jessie Simons MD as PCP - General (Family Medicine)  Edith Sears MD as Referring Physician (Neurology)  Joo Kent MD as Consulting Physician (Cardiology)

## 2017-03-09 ENCOUNTER — HOSPITAL ENCOUNTER (EMERGENCY)
Facility: HOSPITAL | Age: 67
Discharge: HOME | End: 2017-03-09
Attending: EMERGENCY MEDICINE | Admitting: EMERGENCY MEDICINE

## 2017-03-09 ENCOUNTER — APPOINTMENT (OUTPATIENT)
Dept: CT IMAGING | Facility: HOSPITAL | Age: 67
End: 2017-03-09

## 2017-03-09 VITALS
OXYGEN SATURATION: 96 % | DIASTOLIC BLOOD PRESSURE: 91 MMHG | HEIGHT: 69 IN | RESPIRATION RATE: 16 BRPM | TEMPERATURE: 97.6 F | WEIGHT: 220 LBS | HEART RATE: 54 BPM | BODY MASS INDEX: 32.58 KG/M2 | SYSTOLIC BLOOD PRESSURE: 149 MMHG

## 2017-03-09 DIAGNOSIS — S80.811A LEG ABRASION, RIGHT, INITIAL ENCOUNTER: ICD-10-CM

## 2017-03-09 DIAGNOSIS — S09.90XA HEAD INJURY, INITIAL ENCOUNTER: Primary | ICD-10-CM

## 2017-03-09 DIAGNOSIS — S01.81XA FOREHEAD LACERATION, INITIAL ENCOUNTER: ICD-10-CM

## 2017-03-09 PROCEDURE — 72125 CT NECK SPINE W/O DYE: CPT

## 2017-03-09 PROCEDURE — 99283 EMERGENCY DEPT VISIT LOW MDM: CPT

## 2017-03-09 PROCEDURE — 70450 CT HEAD/BRAIN W/O DYE: CPT

## 2017-03-09 RX ORDER — LIDOCAINE HYDROCHLORIDE AND EPINEPHRINE 10; 10 MG/ML; UG/ML
10 INJECTION, SOLUTION INFILTRATION; PERINEURAL ONCE
Status: COMPLETED | OUTPATIENT
Start: 2017-03-09 | End: 2017-03-09

## 2017-03-09 RX ORDER — HYDROCODONE BITARTRATE AND ACETAMINOPHEN 7.5; 325 MG/1; MG/1
1 TABLET ORAL ONCE
Status: COMPLETED | OUTPATIENT
Start: 2017-03-09 | End: 2017-03-09

## 2017-03-09 RX ADMIN — LIDOCAINE HYDROCHLORIDE,EPINEPHRINE BITARTRATE 10 ML: 10; .01 INJECTION, SOLUTION INFILTRATION; PERINEURAL at 16:37

## 2017-03-09 RX ADMIN — HYDROCODONE BITARTRATE AND ACETAMINOPHEN 1 TABLET: 7.5; 325 TABLET ORAL at 16:07

## 2017-03-09 NOTE — ED PROVIDER NOTES
Subjective   Patient is a 66 y.o. male presenting with fall.   History provided by:  Patient  Fall   Mechanism of injury: fall    Injury location:  Head/neck and leg (Large laceration to center of forehead )  Leg injury location:  R lower leg (Abrasion. No pain with weight bearing. )  Incident location:  Home  Time since incident: PTA   Fall:     Fall occurred: Working on bathroom remodel, slipped through floor board hitting head on the board.   Tetanus status:  Up to date  Associated symptoms: no abdominal pain, no back pain, no chest pain, no difficulty breathing, no headaches, no loss of consciousness, no nausea, no neck pain and no vomiting    Risk factors: no anticoagulation therapy    Patient has been acting normal since injury per family.     Review of Systems   Constitutional: Negative for chills and fever.   HENT: Negative for congestion, ear pain, sore throat and trouble swallowing.    Eyes: Negative for pain, redness and visual disturbance.   Respiratory: Negative for cough and shortness of breath.    Cardiovascular: Negative for chest pain and leg swelling.   Gastrointestinal: Negative for abdominal pain, blood in stool, constipation, diarrhea, nausea and vomiting.   Genitourinary: Negative for difficulty urinating, dysuria and flank pain.   Musculoskeletal: Negative for arthralgias, back pain, joint swelling and neck pain.   Skin: Positive for wound (Right lower leg abrasion. Forehead laceration. ). Negative for rash.   Allergic/Immunologic: Negative.    Neurological: Negative for dizziness, loss of consciousness, syncope, weakness, numbness and headaches.   Psychiatric/Behavioral: Negative for confusion.   All other systems reviewed and are negative.      Past Medical History   Diagnosis Date   • High cholesterol    • History of bradycardia    • Hypertension    • Seizures    • Shingles    • SOB (shortness of breath) on exertion        No Known Allergies    Past Surgical History   Procedure Laterality  Date   • Elbow procedure     • Knee cartilage surgery       Right   • Vagus nerve stimulator implantation     • Lumbar discectomy  09/12/2008     Microendoscopic far lateral dickectomy Left L3-4 (Bean)   • Posterior lumbar/thoracic spine fusion  10/24/2008     TLIF Left w percutaneous pedicle screw fixation L3/4 (Octaviano)   • Craniotomy  05/07/2002     Right temporal lobectomy (Dr. Perez)    • Brain surgery  03/09/2007     VNS Implantation (Dr. Perez) [facility]   • Craniotomy  12/21/2010     Temporal lobectomy (Dr. Perez)    • Vagus nerve stimulator generator change  06/19/2012     (Dr. Perez)    • Cervical discectomy  12/1989     Dr. Chavira   • Carpal tunnel release  11/09/2009     Left (Dr. Torres)   • Carpal tunnel release  12/02/2009     Right (Dr. Torres)   • Pacemaker implantation  07/23/2013     Dr. Kent   • Cataract extraction  10/16/2013     Godfrey   • Cataract extraction     • Coronary angioplasty  10/23/2013     Dr. Kent   • Colonoscopy     • Vagus nerve stimulator implantation Left 2/16/2017     Procedure: VAGUS NERVE STIMULATOR IMPLANTATION;  Surgeon: Christiano Lopez MD;  Location: [facility];  Service:    • Vagus nerve stimulator implantation         History reviewed. No pertinent family history.    Social History     Social History   • Marital status:      Spouse name: N/A   • Number of children: N/A   • Years of education: N/A     Social History Main Topics   • Smoking status: Never Smoker   • Smokeless tobacco: Never Used   • Alcohol use No   • Drug use: No   • Sexual activity: Defer     Other Topics Concern   • None     Social History Narrative   • None           Objective   Physical Exam   Constitutional: He is oriented to person, place, and time. He appears well-developed and well-nourished.   HENT:   Head: Atraumatic.       Nose: Nose normal.   Eyes: Conjunctivae, EOM and lids are normal. Pupils are equal, round, and reactive to light.   Neck: Normal range of motion. Neck  supple.   Cardiovascular: Normal rate, regular rhythm and normal heart sounds.    Pulmonary/Chest: Effort normal and breath sounds normal.   Abdominal: Soft. He exhibits no distension. There is no tenderness. There is no rebound and no guarding.   Musculoskeletal: Normal range of motion. He exhibits no edema, tenderness or deformity.        Right lower leg: He exhibits no tenderness, no swelling and no deformity.   Small abrasion right anterior lower leg. No foreign bodies noted.    Neurological: He is alert and oriented to person, place, and time. He has normal strength. No sensory deficit.   Skin: Skin is warm, dry and intact.   Psychiatric: He has a normal mood and affect. His behavior is normal.   Nursing note and vitals reviewed.      Laceration Repair  Date/Time: 3/9/2017 5:01 PM  Performed by: ALEJANDRO VASQUEZ  Authorized by: KANDICE STROUD   Consent: Verbal consent obtained.  Risks and benefits: risks, benefits and alternatives were discussed  Consent given by: patient  Body area: head/neck  Laceration length: 8 cm  Foreign bodies: no foreign bodies  Anesthesia: local infiltration    Anesthesia:  Anesthesia: local infiltration  Local Anesthetic: lidocaine 1% with epinephrine   Anesthetic total: 6 mL  Preparation: Patient was prepped and draped in the usual sterile fashion.  Irrigation solution: saline  Irrigation method: syringe  Amount of cleaning: extensive  Skin closure: 6-0 nylon  Wound subcutaneous closure material used: 6-0 Vicryl (x6 sutures)   Number of sutures: 17  Technique: simple  Approximation: close  Approximation difficulty: simple  Patient tolerance: Patient tolerated the procedure well with no immediate complications               ED Course  ED Course   Discussed results with patient and he is agreeable with plan. He declined xrays in ED for leg pain. He will return to ED if new or worse sx.      No results found for this or any previous visit (from the past 24 hour(s)).  Note: In addition  to lab results from this visit, the labs listed above may include labs taken at another facility or during a different encounter within the last 24 hours. Please correlate lab times with ED admission and discharge times for further clarification of the services performed during this visit.    CT Head Without Contrast   Final Result   Postoperative findings. There are no acute findings.       D:  03/09/2017   E:  03/09/2017               This report was finalized on 3/9/2017 3:58 PM by Dr. Vance Negrete MD.          CT Cervical Spine Without Contrast   Final Result   Severe osteoarthritis. There is no fracture, subluxation or   soft tissue swelling.         D:  03/09/2017   E:  03/09/2017           This report was finalized on 3/9/2017 3:58 PM by Dr. Vance Negrete MD.            Vitals:    03/09/17 1500 03/09/17 1600 03/09/17 1620 03/09/17 1738   BP: 156/84 151/81 155/75 149/91   Pulse: 55   54   Resp:    16   Temp:       TempSrc:       SpO2: 97%  99% 96%   Weight:       Height:         Medications   lidocaine-EPINEPHrine (XYLOCAINE W/EPI) 1 %-1:591226 injection 10 mL (10 mL Injection Given 3/9/17 1637)   HYDROcodone-acetaminophen (NORCO) 7.5-325 MG per tablet 1 tablet (1 tablet Oral Given 3/9/17 1607)                         MDM    Final diagnoses:   Head injury, initial encounter   Forehead laceration, initial encounter   Leg abrasion, right, initial encounter            JAMIA Hernandez  03/09/17 0529

## 2017-07-05 ENCOUNTER — HOSPITAL ENCOUNTER (OUTPATIENT)
Dept: GENERAL RADIOLOGY | Facility: HOSPITAL | Age: 67
Discharge: HOME | End: 2017-07-05
Attending: INTERNAL MEDICINE | Admitting: INTERNAL MEDICINE

## 2017-07-05 ENCOUNTER — TRANSCRIBE ORDERS (OUTPATIENT)
Dept: ADMINISTRATIVE | Facility: HOSPITAL | Age: 67
End: 2017-07-05

## 2017-07-05 DIAGNOSIS — K59.00 ACUTE CONSTIPATION: Primary | ICD-10-CM

## 2017-07-05 PROCEDURE — 74000 HC ABDOMEN KUB: CPT

## 2018-03-12 ENCOUNTER — LAB (OUTPATIENT)
Dept: LAB | Facility: HOSPITAL | Age: 68
End: 2018-03-12

## 2018-03-12 ENCOUNTER — TRANSCRIBE ORDERS (OUTPATIENT)
Dept: LAB | Facility: HOSPITAL | Age: 68
End: 2018-03-12

## 2018-03-12 DIAGNOSIS — E03.9 MYXEDEMA HEART DISEASE: Primary | ICD-10-CM

## 2018-03-12 DIAGNOSIS — E03.9 MYXEDEMA HEART DISEASE: ICD-10-CM

## 2018-03-12 DIAGNOSIS — I51.9 MYXEDEMA HEART DISEASE: ICD-10-CM

## 2018-03-12 DIAGNOSIS — I51.9 MYXEDEMA HEART DISEASE: Primary | ICD-10-CM

## 2018-03-12 LAB
BASOPHILS # BLD AUTO: 0.01 10*3/MM3 (ref 0–0.2)
BASOPHILS NFR BLD AUTO: 0.2 % (ref 0–1)
DEPRECATED RDW RBC AUTO: 44.6 FL (ref 37–54)
EOSINOPHIL # BLD AUTO: 0.05 10*3/MM3 (ref 0–0.3)
EOSINOPHIL NFR BLD AUTO: 1.1 % (ref 0–3)
ERYTHROCYTE [DISTWIDTH] IN BLOOD BY AUTOMATED COUNT: 12.9 % (ref 11.3–14.5)
HCT VFR BLD AUTO: 40.8 % (ref 38.9–50.9)
HGB BLD-MCNC: 13.8 G/DL (ref 13.1–17.5)
IMM GRANULOCYTES # BLD: 0.01 10*3/MM3 (ref 0–0.03)
IMM GRANULOCYTES NFR BLD: 0.2 % (ref 0–0.6)
LYMPHOCYTES # BLD AUTO: 0.4 10*3/MM3 (ref 0.6–4.8)
LYMPHOCYTES NFR BLD AUTO: 9.1 % (ref 24–44)
MCH RBC QN AUTO: 31.9 PG (ref 27–31)
MCHC RBC AUTO-ENTMCNC: 33.8 G/DL (ref 32–36)
MCV RBC AUTO: 94.4 FL (ref 80–99)
MONOCYTES # BLD AUTO: 0.45 10*3/MM3 (ref 0–1)
MONOCYTES NFR BLD AUTO: 10.2 % (ref 0–12)
NEUTROPHILS # BLD AUTO: 3.49 10*3/MM3 (ref 1.5–8.3)
NEUTROPHILS NFR BLD AUTO: 79.2 % (ref 41–71)
PLATELET # BLD AUTO: 201 10*3/MM3 (ref 150–450)
PMV BLD AUTO: 9.5 FL (ref 6–12)
RBC # BLD AUTO: 4.32 10*6/MM3 (ref 4.2–5.76)
T4 FREE SERPL-MCNC: 1 NG/DL (ref 0.89–1.76)
TSH SERPL DL<=0.05 MIU/L-ACNC: 1.87 MIU/ML (ref 0.35–5.35)
WBC NRBC COR # BLD: 4.41 10*3/MM3 (ref 3.5–10.8)

## 2018-03-12 PROCEDURE — 36415 COLL VENOUS BLD VENIPUNCTURE: CPT | Performed by: INTERNAL MEDICINE

## 2018-03-12 PROCEDURE — 84439 ASSAY OF FREE THYROXINE: CPT | Performed by: INTERNAL MEDICINE

## 2018-03-12 PROCEDURE — 84443 ASSAY THYROID STIM HORMONE: CPT

## 2018-03-12 PROCEDURE — 85025 COMPLETE CBC W/AUTO DIFF WBC: CPT

## 2018-10-12 ENCOUNTER — TRANSCRIBE ORDERS (OUTPATIENT)
Dept: DIABETES SERVICES | Facility: HOSPITAL | Age: 68
End: 2018-10-12

## 2018-10-12 DIAGNOSIS — Z79.899 LONG TERM USE OF DRUG: ICD-10-CM

## 2018-10-12 DIAGNOSIS — R73.01 IMPAIRED FASTING GLUCOSE: Primary | ICD-10-CM

## 2018-10-24 ENCOUNTER — APPOINTMENT (OUTPATIENT)
Dept: DIABETES SERVICES | Facility: HOSPITAL | Age: 68
End: 2018-10-24

## 2018-10-26 ENCOUNTER — APPOINTMENT (OUTPATIENT)
Dept: DIABETES SERVICES | Facility: HOSPITAL | Age: 68
End: 2018-10-26

## 2018-10-31 ENCOUNTER — APPOINTMENT (OUTPATIENT)
Dept: LAB | Facility: HOSPITAL | Age: 68
End: 2018-10-31

## 2018-10-31 ENCOUNTER — TRANSCRIBE ORDERS (OUTPATIENT)
Dept: LAB | Facility: HOSPITAL | Age: 68
End: 2018-10-31

## 2018-10-31 DIAGNOSIS — G40.219 FOCAL EPILEPSY WITH IMPAIRMENT OF CONSCIOUSNESS, INTRACTABLE (HCC): Primary | ICD-10-CM

## 2018-10-31 LAB
ALBUMIN SERPL-MCNC: 4.66 G/DL (ref 3.2–4.8)
ALBUMIN/GLOB SERPL: 2.2 G/DL (ref 1.5–2.5)
ALP SERPL-CCNC: 104 U/L (ref 25–100)
ALT SERPL W P-5'-P-CCNC: 30 U/L (ref 7–40)
ANION GAP SERPL CALCULATED.3IONS-SCNC: 4 MMOL/L (ref 3–11)
AST SERPL-CCNC: 27 U/L (ref 0–33)
BILIRUB SERPL-MCNC: 0.5 MG/DL (ref 0.3–1.2)
BUN BLD-MCNC: 16 MG/DL (ref 9–23)
BUN/CREAT SERPL: 13.8 (ref 7–25)
CALCIUM SPEC-SCNC: 9.5 MG/DL (ref 8.7–10.4)
CHLORIDE SERPL-SCNC: 105 MMOL/L (ref 99–109)
CO2 SERPL-SCNC: 30 MMOL/L (ref 20–31)
CREAT BLD-MCNC: 1.16 MG/DL (ref 0.6–1.3)
DEPRECATED RDW RBC AUTO: 44.6 FL (ref 37–54)
ERYTHROCYTE [DISTWIDTH] IN BLOOD BY AUTOMATED COUNT: 12.7 % (ref 11.3–14.5)
GFR SERPL CREATININE-BSD FRML MDRD: 63 ML/MIN/1.73
GLOBULIN UR ELPH-MCNC: 2.1 GM/DL
GLUCOSE BLD-MCNC: 82 MG/DL (ref 70–100)
HCT VFR BLD AUTO: 39.6 % (ref 38.9–50.9)
HGB BLD-MCNC: 13 G/DL (ref 13.1–17.5)
MCH RBC QN AUTO: 31.5 PG (ref 27–31)
MCHC RBC AUTO-ENTMCNC: 32.8 G/DL (ref 32–36)
MCV RBC AUTO: 95.9 FL (ref 80–99)
PLATELET # BLD AUTO: 185 10*3/MM3 (ref 150–450)
PMV BLD AUTO: 9 FL (ref 6–12)
POTASSIUM BLD-SCNC: 4.8 MMOL/L (ref 3.5–5.5)
PROT SERPL-MCNC: 6.8 G/DL (ref 5.7–8.2)
RBC # BLD AUTO: 4.13 10*6/MM3 (ref 4.2–5.76)
SODIUM BLD-SCNC: 139 MMOL/L (ref 132–146)
WBC NRBC COR # BLD: 4.26 10*3/MM3 (ref 3.5–10.8)

## 2018-10-31 PROCEDURE — 36415 COLL VENOUS BLD VENIPUNCTURE: CPT | Performed by: PSYCHIATRY & NEUROLOGY

## 2018-10-31 PROCEDURE — 85027 COMPLETE CBC AUTOMATED: CPT | Performed by: PSYCHIATRY & NEUROLOGY

## 2018-10-31 PROCEDURE — 80053 COMPREHEN METABOLIC PANEL: CPT | Performed by: PSYCHIATRY & NEUROLOGY

## 2019-04-24 ENCOUNTER — TRANSCRIBE ORDERS (OUTPATIENT)
Dept: ADMINISTRATIVE | Facility: HOSPITAL | Age: 69
End: 2019-04-24

## 2019-04-24 DIAGNOSIS — M79.2 PERIPHERAL NEUROPATHIC PAIN: Primary | ICD-10-CM

## 2019-04-29 ENCOUNTER — TRANSCRIBE ORDERS (OUTPATIENT)
Dept: ADMINISTRATIVE | Facility: HOSPITAL | Age: 69
End: 2019-04-29

## 2019-04-29 DIAGNOSIS — G60.9 IDIOPATHIC PERIPHERAL NEUROPATHY: Primary | ICD-10-CM

## 2019-05-16 ENCOUNTER — TELEPHONE (OUTPATIENT)
Dept: NEUROSURGERY | Facility: CLINIC | Age: 69
End: 2019-05-16

## 2019-05-16 NOTE — TELEPHONE ENCOUNTER
"Provider:  John  Caller: self  Time of call: 1152    Phone #: [phone]   Surgery:  VAGUS NERVE STIMULATOR IMPLANTATION  Surgery Date: 2/16/17   Last visit:  3/8/17   Next visit:   PRN      Reason for call:         Pt states he has been having significant pain behind his left ear for the past week, no improvement since onset. He suspects the leads to his VNS may have become disconnected as he states the device doesn't feel like it is \"kicking in\". However he denies any recent changes to his seizure activity. He has contacted his neurologist but laments that it is quite difficult to get an appointment with them.    "

## 2019-05-16 NOTE — TELEPHONE ENCOUNTER
Please let pt. Know that it is unlikely coming from VNS. If he can contact rep and let them know - they may have a suggestion - however since his seizures are well managed, doesn't appear to be coming from stimulator.      Best to get him in to see his neurologist.

## 2019-05-21 ENCOUNTER — TELEPHONE (OUTPATIENT)
Dept: NEUROSURGERY | Facility: CLINIC | Age: 69
End: 2019-05-21

## 2019-05-21 NOTE — TELEPHONE ENCOUNTER
"I spoke with patient.   - he continues to describe posterior ear pain and was confused as to where the leads were placed.  Pt. Thought they were placed \"up in his neck towards his brain\". I reassured him of placement and he understands that symptoms are unrelated.     He will continue to observe and FU prn   "

## 2019-05-23 ENCOUNTER — TRANSCRIBE ORDERS (OUTPATIENT)
Dept: ADMINISTRATIVE | Facility: HOSPITAL | Age: 69
End: 2019-05-23

## 2019-05-23 ENCOUNTER — HOSPITAL ENCOUNTER (OUTPATIENT)
Dept: GENERAL RADIOLOGY | Facility: HOSPITAL | Age: 69
Discharge: HOME | End: 2019-05-23
Admitting: INTERNAL MEDICINE

## 2019-05-23 DIAGNOSIS — R22.2 SUPRACLAVICULAR FOSSA FULLNESS: Primary | ICD-10-CM

## 2019-05-23 PROCEDURE — 71046 X-RAY EXAM CHEST 2 VIEWS: CPT

## 2019-06-04 ENCOUNTER — HOSPITAL ENCOUNTER (OUTPATIENT)
Dept: NEUROLOGY | Facility: HOSPITAL | Age: 69
Discharge: HOME | End: 2019-06-04
Admitting: INTERNAL MEDICINE

## 2019-06-04 DIAGNOSIS — G60.9 IDIOPATHIC PERIPHERAL NEUROPATHY: ICD-10-CM

## 2019-06-04 PROCEDURE — 95913 NRV CNDJ TEST 13/> STUDIES: CPT

## 2019-06-04 PROCEDURE — 95886 MUSC TEST DONE W/N TEST COMP: CPT

## 2019-06-10 ENCOUNTER — APPOINTMENT (OUTPATIENT)
Dept: NEUROLOGY | Facility: HOSPITAL | Age: 69
End: 2019-06-10

## 2020-03-11 ENCOUNTER — HOSPITAL ENCOUNTER (OUTPATIENT)
Dept: GENERAL RADIOLOGY | Facility: HOSPITAL | Age: 70
Discharge: HOME | End: 2020-03-11
Admitting: INTERNAL MEDICINE

## 2020-03-11 ENCOUNTER — TRANSCRIBE ORDERS (OUTPATIENT)
Dept: ADMINISTRATIVE | Facility: HOSPITAL | Age: 70
End: 2020-03-11

## 2020-03-11 DIAGNOSIS — J18.9 PNEUMONIA DUE TO INFECTIOUS ORGANISM, UNSPECIFIED LATERALITY, UNSPECIFIED PART OF LUNG: Primary | ICD-10-CM

## 2020-03-11 PROCEDURE — 71046 X-RAY EXAM CHEST 2 VIEWS: CPT

## 2020-05-19 ENCOUNTER — OFFICE VISIT (OUTPATIENT)
Dept: NEUROSURGERY | Facility: CLINIC | Age: 70
End: 2020-05-19

## 2020-05-19 ENCOUNTER — TELEPHONE (OUTPATIENT)
Dept: NEUROSURGERY | Facility: CLINIC | Age: 70
End: 2020-05-19

## 2020-05-19 VITALS — HEIGHT: 69 IN | BODY MASS INDEX: 31.84 KG/M2 | WEIGHT: 215 LBS | TEMPERATURE: 98 F

## 2020-05-19 DIAGNOSIS — G60.9 IDIOPATHIC PERIPHERAL NEUROPATHY: ICD-10-CM

## 2020-05-19 DIAGNOSIS — G56.03 BILATERAL CARPAL TUNNEL SYNDROME: Primary | ICD-10-CM

## 2020-05-19 PROCEDURE — 99243 OFF/OP CNSLTJ NEW/EST LOW 30: CPT | Performed by: NEUROLOGICAL SURGERY

## 2020-05-19 RX ORDER — LEVOTHYROXINE SODIUM 0.05 MG/1
50 TABLET ORAL DAILY
COMMUNITY

## 2020-05-19 NOTE — PROGRESS NOTES
Patient: Harsha Delaney  : 1950    Primary Care Provider: Columba Walden MD    Requesting Provider: As above        History    Chief Complaint: Numbness and tingling in the fingers and feet.    History of Present Illness: Mr. Delaney is a 69-year-old gentleman who is known to my service.  He has had lifelong seizures and I previously changed out the battery on his vagal nerve stimulator.  He now complains a greater than 1 year history of dysesthesia involving his feet associated with numbness.  He also has symptoms involving all of the fingers of each hand.  In  he had undergone carpal tunnel release.  Symptoms in his hands are not worse in any time of day or night.  He denies any radicular symptoms in his arms or legs.  Sometimes his lower extremity symptoms improve when he props his feet up and takes his shoes off.  He could not tolerate Neurontin.  A year ago he underwent electrodiagnostic studies.    Review of Systems   Constitutional: Negative for activity change, appetite change, chills, diaphoresis, fatigue, fever and unexpected weight change.   HENT: Negative for congestion, dental problem, drooling, ear discharge, ear pain, facial swelling, hearing loss, mouth sores, nosebleeds, postnasal drip, rhinorrhea, sinus pressure, sneezing, sore throat, tinnitus, trouble swallowing and voice change.    Eyes: Negative for photophobia, pain, discharge, redness, itching and visual disturbance.   Respiratory: Negative for apnea, cough, choking, chest tightness, shortness of breath, wheezing and stridor.    Cardiovascular: Negative for chest pain, palpitations and leg swelling.   Gastrointestinal: Negative for abdominal distention, abdominal pain, anal bleeding, blood in stool, constipation, diarrhea, nausea, rectal pain and vomiting.   Endocrine: Negative for cold intolerance, heat intolerance, polydipsia, polyphagia and polyuria.   Genitourinary: Negative for decreased urine volume, difficulty urinating,  "dysuria, enuresis, flank pain, frequency, genital sores, hematuria and urgency.   Musculoskeletal: Negative for arthralgias, back pain, gait problem, joint swelling, myalgias, neck pain and neck stiffness.   Skin: Negative for color change, pallor, rash and wound.   Allergic/Immunologic: Negative for environmental allergies, food allergies and immunocompromised state.   Neurological: Positive for seizures. Negative for dizziness, tremors, syncope, facial asymmetry, speech difficulty, weakness, light-headedness, numbness and headaches.   Hematological: Negative for adenopathy. Does not bruise/bleed easily.   Psychiatric/Behavioral: Negative for agitation, behavioral problems, confusion, decreased concentration, dysphoric mood, hallucinations, self-injury, sleep disturbance and suicidal ideas. The patient is not nervous/anxious and is not hyperactive.        The patient's past medical history, past surgical history, family history, and social history have been reviewed at length in the electronic medical record.    Physical Exam:   Temp 98 °F (36.7 °C) (Temporal)   Ht 175.3 cm (69\")   Wt 97.5 kg (215 lb)   BMI 31.75 kg/m²   CONSTITUTIONAL: Patient is well-nourished, pleasant and appears stated age.  CV: Heart regular rate and rhythm without murmur, rub, or gallop.  PULMONARY: Lungs are clear to ascultation.  MUSCULOSKELETAL:  Straight leg raising is negative.  Enrike's Sign is negative.  ROM in back normal.  Tenderness in the back to palpation is not observed.  Tinel sign is negative at each wrist.  NEUROLOGICAL:  Orientation, memory, attention span, language function, and cognition have been examined and are intact.  Strength is intact in the lower extremities to direct testing.  Muscle tone is normal throughout.  Station and gait are normal.  Sensation is intact to light touch testing throughout.  Deep tendon reflexes are 1+ and symmetrical throughout but absent at the ankles.  Coordination is " intact.      Medical Decision Making    Data Review:   Electrodiagnostic studies from June 2019 interpreted by Dr. Shah demonstrate a mild peripheral neuropathy and a mild to moderate carpal tunnel syndrome bilaterally.    Diagnosis:   1.  Peripheral neuropathy.  2.  Mild bilateral carpal tunnel syndrome.    Treatment Options:   All of the fingers of each hand are afflicted and I think the carpal tunnel syndrome is a small component of his difficulties.  I have prescribed wrist splints.  I am going to refer him on to neurology in the [facility] system.  He will follow-up with me as needed.  Once again I think the lion's share of his difficulties emanate from his peripheral neuropathy.       Diagnosis Plan   1. Bilateral carpal tunnel syndrome   Wrist Hand Orthosis, Wrist Extension Control Cock-up   2. Idiopathic peripheral neuropathy  Ambulatory Referral to Neurology       Scribed for Christiano Lopez MD by Amaris Macedo CMA on 05/19/2020 at 10:24 AM      I, Dr. Lopez, personally performed the services described in the documentation, as scribed in my presence, and it is both accurate and complete.

## 2020-06-02 ENCOUNTER — TELEPHONE (OUTPATIENT)
Dept: NEUROLOGY | Facility: CLINIC | Age: 70
End: 2020-06-02

## 2020-06-02 NOTE — TELEPHONE ENCOUNTER
Patient can be seen by either me or Dr. Sepulveda whoever has a sooner appointment or has a cancellation.  Thanks

## 2020-06-02 NOTE — TELEPHONE ENCOUNTER
PT CALLED IN REQUESTING A SOONER APPT. HE SAID HIS NEUROPATHY HAS GOTTEN WORSE IN HIS LEGS AND DOWN BUT ESPECIALLY IN HIS FEET. HE SAID IT'S BEEN GETTING PROGRESSIVELY WORSE EVER SINCE HE HAD GOTTEN HIS EMG LAST June. PT HAS BEEN ADDED TO THE WAITING LIST.     BEST CALL BACK- [phone]

## 2020-07-09 ENCOUNTER — LAB (OUTPATIENT)
Dept: LAB | Facility: HOSPITAL | Age: 70
End: 2020-07-09

## 2020-07-09 ENCOUNTER — OFFICE VISIT (OUTPATIENT)
Dept: NEUROLOGY | Facility: CLINIC | Age: 70
End: 2020-07-09

## 2020-07-09 VITALS
SYSTOLIC BLOOD PRESSURE: 132 MMHG | HEIGHT: 69 IN | BODY MASS INDEX: 31.7 KG/M2 | WEIGHT: 214 LBS | DIASTOLIC BLOOD PRESSURE: 70 MMHG | TEMPERATURE: 97.7 F

## 2020-07-09 DIAGNOSIS — G60.9 IDIOPATHIC PERIPHERAL NEUROPATHY: ICD-10-CM

## 2020-07-09 DIAGNOSIS — G40.409 OTHER GENERALIZED EPILEPSY, NOT INTRACTABLE, WITHOUT STATUS EPILEPTICUS (HCC): ICD-10-CM

## 2020-07-09 DIAGNOSIS — G60.9 IDIOPATHIC PERIPHERAL NEUROPATHY: Primary | ICD-10-CM

## 2020-07-09 LAB
ERYTHROCYTE [SEDIMENTATION RATE] IN BLOOD: 7 MM/HR (ref 0–20)
FOLATE SERPL-MCNC: >20 NG/ML (ref 4.78–24.2)
HBA1C MFR BLD: 5.6 % (ref 4.8–5.6)
RPR SER QL: NORMAL
VIT B12 BLD-MCNC: 1086 PG/ML (ref 211–946)

## 2020-07-09 PROCEDURE — 82607 VITAMIN B-12: CPT

## 2020-07-09 PROCEDURE — 82595 ASSAY OF CRYOGLOBULIN: CPT

## 2020-07-09 PROCEDURE — 99204 OFFICE O/P NEW MOD 45 MIN: CPT | Performed by: PSYCHIATRY & NEUROLOGY

## 2020-07-09 PROCEDURE — 82746 ASSAY OF FOLIC ACID SERUM: CPT

## 2020-07-09 PROCEDURE — 86235 NUCLEAR ANTIGEN ANTIBODY: CPT

## 2020-07-09 PROCEDURE — 86225 DNA ANTIBODY NATIVE: CPT

## 2020-07-09 PROCEDURE — 36415 COLL VENOUS BLD VENIPUNCTURE: CPT

## 2020-07-09 PROCEDURE — 83036 HEMOGLOBIN GLYCOSYLATED A1C: CPT

## 2020-07-09 PROCEDURE — 86592 SYPHILIS TEST NON-TREP QUAL: CPT

## 2020-07-09 PROCEDURE — 86618 LYME DISEASE ANTIBODY: CPT

## 2020-07-09 PROCEDURE — 86038 ANTINUCLEAR ANTIBODIES: CPT

## 2020-07-09 PROCEDURE — 84165 PROTEIN E-PHORESIS SERUM: CPT

## 2020-07-09 PROCEDURE — 85652 RBC SED RATE AUTOMATED: CPT

## 2020-07-09 PROCEDURE — 84155 ASSAY OF PROTEIN SERUM: CPT

## 2020-07-09 RX ORDER — PREGABALIN 25 MG/1
75 CAPSULE ORAL NIGHTLY
Qty: 90 CAPSULE | Refills: 2 | Status: SHIPPED | OUTPATIENT
Start: 2020-07-09 | End: 2020-09-01 | Stop reason: SDUPTHER

## 2020-07-09 RX ORDER — LANOLIN ALCOHOL/MO/W.PET/CERES
1000 CREAM (GRAM) TOPICAL DAILY
COMMUNITY

## 2020-07-09 RX ORDER — MULTIPLE VITAMINS W/ MINERALS TAB 9MG-400MCG
1 TAB ORAL DAILY
COMMUNITY

## 2020-07-09 NOTE — PROGRESS NOTES
"Subjective:    CC: Harsha Delaney is seen today in consultation at the request of Christiano Lopez MD for Peripheral Neuropathy       HPI:  Patient is a 69-year-old male with past medical history of generalized epilepsy status post VNS placement in 2007-well-controlled on carbamazepine 400 mg twice daily, hypothyroidism referred to the clinic for evaluation of peripheral neuropathy.  He reports that he started having symptoms back in March 2019 where he noticed tingling and numbness involving the fingertips of both his hands.  This slowly progressed to involve both his feet.  Since then, he reports that the symptoms in his hands have remained stable but it has become significantly worse in both his feet.  He reports predominantly numbness but also intermittent tingling, pain involving both his feet.  He denies any problems with balance or weakness.  He reports that likely at night, it does not affect his sleep.  Symptoms are usually worse when he is walking or putting pressure on his feet.  He has been tested for diabetes and as per him there is no evidence of diabetes.  He was given a trial of low-dose gabapentin 100 to 200 mg but it did not help with the symptoms.  He reports that in the past, many years ago he tried Lyrica and it caused him to gain weight.  He does not remember why he was started on Lyrica.  He does take carbamazepine 400 mg twice daily for epilepsy and he has not had seizures.  He also has had EMG/nerve conduction study back in June 2019 which showed mild peripheral neuropathy and mild carpal cell syndrome bilaterally.    The following portions of the patient's history were reviewed today and updated as of 07/09/2020  : {history reviewed:42939::\"allergies\",\"social history\",\"problem list\"}.  This document will be scanned to patient's chart.      Current Outpatient Medications:   •  carBAMazepine XR (TEGretol  XR) 400 MG 12 hr tablet, Take 400 mg by mouth 2 (Two) Times a Day., Disp: , Rfl:   •  " clonazePAM (KlonoPIN) 0.5 MG tablet, Take 1.5 mg by mouth 2 (Two) Times a Day., Disp: , Rfl:   •  donepezil (ARICEPT) 10 MG tablet, Take 10 mg by mouth Daily., Disp: , Rfl:   •  HYDROcodone-acetaminophen (NORCO) 7.5-325 MG per tablet, Take 1 tablet by mouth 3 (Three) Times a Day As Needed for moderate pain (4-6) (Pain)., Disp: 30 tablet, Rfl: 0  •  levothyroxine (SYNTHROID, LEVOTHROID) 50 MCG tablet, Take 50 mcg by mouth Daily., Disp: , Rfl:   •  Multiple Vitamins-Minerals (MULTIVITAMIN WITH MINERALS) tablet tablet, Take 1 tablet by mouth Daily., Disp: , Rfl:   •  simvastatin (ZOCOR) 40 MG tablet, Take 40 mg by mouth Daily., Disp: , Rfl:   •  vitamin B-12 (CYANOCOBALAMIN) 1000 MCG tablet, Take 1,000 mcg by mouth Daily., Disp: , Rfl:   •  Cannabidiol (EPIDIOLEX) 100 MG/ML solution, Take 2.2ml twice daily for 1 week, then take 4.4 mL 2 (Two) Times a Day., Disp: 300 mL, Rfl: 0   Past Medical History:   Diagnosis Date   • High cholesterol    • History of bradycardia    • Hypertension    • Seizures (CMS/HCC)    • Shingles    • SOB (shortness of breath) on exertion       Past Surgical History:   Procedure Laterality Date   • BRAIN SURGERY  03/09/2007    VNS Implantation (Dr. Perez)    • CARPAL TUNNEL RELEASE  11/09/2009    Left (Dr. Torres)   • CARPAL TUNNEL RELEASE  12/02/2009    Right (Dr. Torres)   • CATARACT EXTRACTION  10/16/2013    Epifanio   • CATARACT EXTRACTION     • CERVICAL DISCECTOMY ANTERIOR  12/1989    Dr. Chavira   • COLONOSCOPY     • CORONARY ANGIOPLASTY  10/23/2013    Dr. Kent   • CRANIOTOMY  05/07/2002    Right temporal lobectomy (Dr. Perez)    • CRANIOTOMY  12/21/2010    Temporal lobectomy (Dr. Perez)    • ELBOW PROCEDURE     • KNEE CARTILAGE SURGERY      Right   • LUMBAR DISCECTOMY  09/12/2008    Microendoscopic far lateral dickectomy Left L3-4 ([place])   • PACEMAKER IMPLANTATION  07/23/2013    Dr. Kent   • POSTERIOR LUMBAR/THORACIC SPINE FUSION  10/24/2008    TLIF Left w percutaneous  "pedicle screw fixation L3/4 ([facility])   • VAGUS NERVE STIMULATOR GENERATOR CHANGE  06/19/2012    (Dr. Perez) [place]   • VAGUS NERVE STIMULATOR IMPLANTATION     • VAGUS NERVE STIMULATOR IMPLANTATION Left 2/16/2017    Procedure: VAGUS NERVE STIMULATOR IMPLANTATION;  Surgeon: Christiano Lopez MD;  Location: [facility];  Service:    • VAGUS NERVE STIMULATOR IMPLANTATION        History reviewed. No pertinent family history.   Review of Systems   Eyes: Positive for pain and redness.   Respiratory: Positive for shortness of breath.    Gastrointestinal: Positive for nausea.   Genitourinary: Positive for difficulty urinating.   Neurological: Positive for seizures, numbness and headache.       All other systems reviewed and are negative     Objective:    /70   Temp 97.7 °F (36.5 °C)   Ht 175.3 cm (69\")   Wt 97.1 kg (214 lb)   BMI 31.60 kg/m²     Neurology Exam:    General apperance: NAD.     Mental status: Alert, awake and oriented to time place and person.    Recent and Remote memory: Can recall 3/3 objects at 5 minutes. Can recall historical events.     Attention span and Concentration: Serial 7s: Normal.     Fund of knowledge:  Normal.     Language and Speech: No aphasia or dysarthria.    Naming , Repitition and Comprehension:  Can name objects, repeat a sentence and follow commands. Speech is clear and fluent with good repetition, comprehension, and naming.    Cranial Nerves:   CN II: Visual fields are full. Intact. Fundi - Normal, No papillederma, Pupils - JUSTA  CN III, IV and VI: Extraocular movements are intact. Normal saccades.   CN V: Facial sensation is intact.   CN VII: Muscles of facial expression reveal no asymmetry. Intact.   CN VIII: Hearing is intact. Whispered voice intact.   CN IX and X: Palate elevates symmetrically. Intact  CN XI: Shoulder shrug is intact.   CN XII: Tongue is midline without evidence of atrophy or fasciculation.     Motor:  Right UE muscle strength 5/5. Normal tone.     Left UE muscle " strength 5/5. Normal tone.      Right LE muscle strength5/5. Normal tone.     Left LE muscle strength 5/5. Normal tone.      Sensory: Normal light touch, vibration and pinprick sensation bilaterally.    DTRs: 2+ bilaterally in upper and lower extremities.    Babinski: Negative bilaterally.    Co-ordination: Normal finger-to-nose, heel to shin B/L.    Rhomberg: Negative.    Gait: Normal.    Cardiovascular: Regular rate and rhythm without murmur, gallop or rub.    Ophthalmoscopic exam: Normal fundi, no papilledema.    Assessment and Plan:  1. Idiopathic peripheral neuropathy  2. Other generalized epilepsy, not intractable, without status epilepticus (CMS/HCC)  -I would like to get detailed neuropathy work-up for further evaluation.  Since symptoms in his feet are really intractable, will start him on Lyrica 25 mg at bedtime slowly increasing to 75 mg at bedtime for symptomatic relief.  He is currently on carbamazepine 40 twice daily and he can be increased further in future as an additive treatment for neuropathy symptoms.  I will see him back in 6 to 8 weeks for follow-up.    - Hemoglobin A1c; Future  - Lyme Disease IgG/IgM Antibodies; Future  - Protein Elec + Interp, Serum; Future  - RPR; Future  - Sedimentation Rate; Future  - Sjogren's Antibody, Anti-SS-A / -SS-B; Future  - T4 & TSH ([facility]); Future  - Vitamin B12 & Folate; Future  - Cryoglobulin; Future  - NKECHI; Future  - Anti-DNA Antibody, Double-stranded; Future    Return in about 8 weeks (around 9/3/2020).     Aiden Sepulveda MD

## 2020-07-09 NOTE — PROGRESS NOTES
CC: Harsha Delaney is seen today in consultation at the request of Christiano Lopez MD for Peripheral Neuropathy     HPI:   Patient is a 69-year-old male with past medical history of generalized epilepsy status post VNS placement in 2007-well-controlled on carbamazepine 400 mg twice daily, hypothyroidism referred to the clinic for evaluation of peripheral neuropathy. He reports that he started having symptoms back in March 2019 where he noticed tingling and numbness involving the fingertips of both his hands. This slowly progressed to involve both his feet. Since then, he reports that the symptoms in his hands have remained stable but it has become significantly worse in both his feet. He reports predominantly numbness but also intermittent tingling, pain involving both his feet. He denies any problems with balance or weakness. He reports that likely at night, it does not affect his sleep. Symptoms are usually worse when he is walking or putting pressure on his feet. He has been tested for diabetes and as per him there is no evidence of diabetes. He was given a trial of low-dose gabapentin 100 to 200 mg but it did not help with the symptoms. He reports that in the past, many years ago he tried Lyrica and it caused him to gain weight. He does not remember why he was started on Lyrica. He does take carbamazepine 400 mg twice daily for epilepsy and he has not had seizures. He also has had EMG/nerve conduction study back in June 2019 which showed mild peripheral neuropathy and mild carpal cell syndrome bilaterally.     The following portions of the patient's history were reviewed today and updated as of 07/09/2020 : allergies, social history and problem list. This document will be scanned to patient's chart.     Current Outpatient Medications:   • carBAMazepine XR (TEGretol XR) 400 MG 12 hr tablet, Take 400 mg by mouth 2 (Two) Times a Day., Disp: , Rfl:   • clonazePAM (KlonoPIN) 0.5 MG tablet, Take 1.5 mg by mouth 2  "(Two) Times a Day., Disp: , Rfl:   • donepezil (ARICEPT) 10 MG tablet, Take 10 mg by mouth Daily., Disp: , Rfl:   • HYDROcodone-acetaminophen (NORCO) 7.5-325 MG per tablet, Take 1 tablet by mouth 3 (Three) Times a Day As Needed for moderate pain (4-6) (Pain)., Disp: 30 tablet, Rfl: 0   • levothyroxine (SYNTHROID, LEVOTHROID) 50 MCG tablet, Take 50 mcg by mouth Daily., Disp: , Rfl:   • Multiple Vitamins-Minerals (MULTIVITAMIN WITH MINERALS) tablet tablet, Take 1 tablet by mouth Daily., Disp: , Rfl:   • simvastatin (ZOCOR) 40 MG tablet, Take 40 mg by mouth Daily., Disp: , Rfl:   • vitamin B-12 (CYANOCOBALAMIN) 1000 MCG tablet, Take 1,000 mcg by mouth Daily., Disp: , Rfl:   • Cannabidiol (EPIDIOLEX) 100 MG/ML solution, Take 2.2ml twice daily for 1 week, then take 4.4 mL 2 (Two) Times a Day., Disp: 300 mL, Rfl: 0   Medical History                                                        Surgical History                                                                                                                                                                                                                                 History reviewed. No pertinent family history.   Review of Systems   Eyes: Positive for pain and redness.   Respiratory: Positive for shortness of breath.   Gastrointestinal: Positive for nausea.   Genitourinary: Positive for difficulty urinating.   Neurological: Positive for seizures, numbness and headache.     All other systems reviewed and are negative   Objective:   /70  Temp 97.7 °F (36.5 °C)  Ht 175.3 cm (69\")  Wt 97.1 kg (214 lb)  BMI 31.60 kg/m²   Neurology Exam:   General apperance: NAD.   Mental status: Alert, awake and oriented to time place and person.   Recent and Remote memory: Can recall 3/3 objects at 5 minutes. Can recall historical events.   Attention span and Concentration: Serial 7s: Normal.   Fund of knowledge: Normal.   Language and Speech: No aphasia or dysarthria. "   Naming , Repitition and Comprehension: Can name objects, repeat a sentence and follow commands. Speech is clear and fluent with good repetition, comprehension, and naming.   Cranial Nerves:   CN II: Visual fields are full. Intact. Fundi - Normal, No papillederma, Pupils - JUSTA   CN III, IV and VI: Extraocular movements are intact. Normal saccades.   CN V: Facial sensation is intact.   CN VII: Muscles of facial expression reveal no asymmetry. Intact.   CN VIII: Hearing is intact. Whispered voice intact.   CN IX and X: Palate elevates symmetrically. Intact   CN XI: Shoulder shrug is intact.   CN XII: Tongue is midline without evidence of atrophy or fasciculation.   Motor:   Right UE muscle strength 5/5. Normal tone.   Left UE muscle strength 5/5. Normal tone.   Right LE muscle strength5/5. Normal tone.   Left LE muscle strength 5/5. Normal tone.   Sensory: Normal light touch, vibration and pinprick sensation bilaterally.   DTRs: 2+ bilaterally in upper and lower extremities.   Babinski: Negative bilaterally.   Co-ordination: Normal finger-to-nose, heel to shin B/L.   Rhomberg: Negative.   Gait: Normal.   Cardiovascular: Regular rate and rhythm without murmur, gallop or rub.   Ophthalmoscopic exam: Normal fundi, no papilledema.   Assessment and Plan:   1. Idiopathic peripheral neuropathy   2. Other generalized epilepsy, not intractable, without status epilepticus (CMS/HCC)   -I would like to get detailed neuropathy work-up for further evaluation. Since symptoms in his feet are really intractable, will start him on Lyrica 25 mg at bedtime slowly increasing to 75 mg at bedtime for symptomatic relief. He is currently on carbamazepine 40 twice daily and he can be increased further in future as an additive treatment for neuropathy symptoms. I will see him back in 6 to 8 weeks for follow-up.   - Hemoglobin A1c; Future   - Lyme Disease IgG/IgM Antibodies; Future   - Protein Elec + Interp, Serum; Future   - RPR; Future   -  Sedimentation Rate; Future   - Sjogren's Antibody, Anti-SS-A / -SS-B; Future   - T4 & TSH ([facility]); Future   - Vitamin B12 & Folate; Future   - Cryoglobulin; Future   - NKECHI; Future   - Anti-DNA Antibody, Double-stranded; Future   Return in about 8 weeks (around 9/3/2020).   Aiden Sepulveda MD

## 2020-07-10 LAB
ANA SER QL: NEGATIVE
B BURGDOR IGG SER QL: NEGATIVE
B BURGDOR IGM SER QL: NEGATIVE
DSDNA AB SER-ACNC: <1 IU/ML (ref 0–9)
ENA SS-A AB SER-ACNC: <0.2 AI (ref 0–0.9)
ENA SS-B AB SER-ACNC: <0.2 AI (ref 0–0.9)

## 2020-07-13 LAB
ALBUMIN SERPL-MCNC: 4.2 G/DL (ref 2.9–4.4)
ALBUMIN/GLOB SERPL: 1.5 {RATIO} (ref 0.7–1.7)
ALPHA1 GLOB FLD ELPH-MCNC: 0.2 G/DL (ref 0–0.4)
ALPHA2 GLOB SERPL ELPH-MCNC: 0.7 G/DL (ref 0.4–1)
B-GLOBULIN SERPL ELPH-MCNC: 1 G/DL (ref 0.7–1.3)
CRYOGLOB SER QL 1D COLD INC: NORMAL
GAMMA GLOB SERPL ELPH-MCNC: 0.8 G/DL (ref 0.4–1.8)
GLOBULIN SER CALC-MCNC: 2.8 G/DL (ref 2.2–3.9)
Lab: NORMAL
M-SPIKE: NORMAL G/DL
PROT PATTERN SERPL ELPH-IMP: NORMAL
PROT SERPL-MCNC: 7 G/DL (ref 6–8.5)

## 2020-07-14 ENCOUNTER — RESULTS ENCOUNTER (OUTPATIENT)
Dept: NEUROLOGY | Facility: CLINIC | Age: 70
End: 2020-07-14

## 2020-07-14 ENCOUNTER — TELEPHONE (OUTPATIENT)
Dept: NEUROLOGY | Facility: CLINIC | Age: 70
End: 2020-07-14

## 2020-07-14 DIAGNOSIS — G60.9 IDIOPATHIC PERIPHERAL NEUROPATHY: ICD-10-CM

## 2020-07-14 NOTE — TELEPHONE ENCOUNTER
----- Message from Aiden Sepulveda MD sent at 7/14/2020  9:46 AM EDT -----  Inform patient normal.  Detailed neuropathy work-up is negative.

## 2020-09-01 ENCOUNTER — OFFICE VISIT (OUTPATIENT)
Dept: NEUROLOGY | Facility: CLINIC | Age: 70
End: 2020-09-01

## 2020-09-01 VITALS
OXYGEN SATURATION: 100 % | HEART RATE: 56 BPM | WEIGHT: 218 LBS | DIASTOLIC BLOOD PRESSURE: 70 MMHG | HEIGHT: 69 IN | BODY MASS INDEX: 32.29 KG/M2 | SYSTOLIC BLOOD PRESSURE: 122 MMHG | TEMPERATURE: 97.6 F

## 2020-09-01 DIAGNOSIS — G40.409 OTHER GENERALIZED EPILEPSY, NOT INTRACTABLE, WITHOUT STATUS EPILEPTICUS (HCC): Primary | ICD-10-CM

## 2020-09-01 DIAGNOSIS — G60.9 IDIOPATHIC PERIPHERAL NEUROPATHY: ICD-10-CM

## 2020-09-01 PROCEDURE — 99214 OFFICE O/P EST MOD 30 MIN: CPT | Performed by: PSYCHIATRY & NEUROLOGY

## 2020-09-01 RX ORDER — PREGABALIN 100 MG/1
100 CAPSULE ORAL NIGHTLY
Qty: 30 CAPSULE | Refills: 3 | Status: SHIPPED | OUTPATIENT
Start: 2020-09-01 | End: 2020-10-19 | Stop reason: SDUPTHER

## 2020-09-01 NOTE — PROGRESS NOTES
Subjective:    CC: Harsha Delaney is in clinic today for follow up for idiopathic peripheral neuropathy.    HPI:  Initial visit: 7/9/2020 : patient is a 69-year-old male with past medical history of generalized epilepsy status post VNS placement in 2007-well-controlled on carbamazepine 400 mg twice daily, hypothyroidism referred to the clinic for evaluation of peripheral neuropathy. He reports that he started having symptoms back in March 2019 where he noticed tingling and numbness involving the fingertips of both his hands. This slowly progressed to involve both his feet. Since then, he reports that the symptoms in his hands have remained stable but it has become significantly worse in both his feet. He reports predominantly numbness but also intermittent tingling, pain involving both his feet. He denies any problems with balance or weakness. He reports that likely at night, it does not affect his sleep. Symptoms are usually worse when he is walking or putting pressure on his feet. He has been tested for diabetes and as per him there is no evidence of diabetes. He was given a trial of low-dose gabapentin 100 to 200 mg but it did not help with the symptoms. He reports that in the past, many years ago he tried Lyrica and it caused him to gain weight. He does not remember why he was started on Lyrica. He does take carbamazepine 400 mg twice daily for epilepsy and he has not had seizures. He also has had EMG/nerve conduction study back in June 2019 which showed mild peripheral neuropathy and mild carpal cell syndrome bilaterally.     9/1/2020: He is in clinic for regular follow-up.  Since his last visit, he has now completed detailed neuropathy work-up just was normal.  He has been taking Lyrica as per my instruction currently on 75 mg nightly dose.  He reports that it has not helped with the symptoms.  At night, he gets severe numbness involving both his feet and he reports that his both feet feel extremely cold.  He  reports gaining 2 pounds since starting the medication.  He denies any other side effects.    The following portions of the patient's history were reviewed and updated as of 09/01/2020: allergies, social history and problem list.       Current Outpatient Medications:   •  Cannabidiol (EPIDIOLEX) 100 MG/ML solution, Take 2.2ml twice daily for 1 week, then take 4.4 mL 2 (Two) Times a Day., Disp: 300 mL, Rfl: 0  •  carBAMazepine XR (TEGretol  XR) 400 MG 12 hr tablet, Take 400 mg by mouth 2 (Two) Times a Day., Disp: , Rfl:   •  clonazePAM (KlonoPIN) 0.5 MG tablet, Take 1.5 mg by mouth 2 (Two) Times a Day., Disp: , Rfl:   •  donepezil (ARICEPT) 10 MG tablet, Take 10 mg by mouth Daily., Disp: , Rfl:   •  HYDROcodone-acetaminophen (NORCO) 7.5-325 MG per tablet, Take 1 tablet by mouth 3 (Three) Times a Day As Needed for moderate pain (4-6) (Pain)., Disp: 30 tablet, Rfl: 0  •  levothyroxine (SYNTHROID, LEVOTHROID) 50 MCG tablet, Take 50 mcg by mouth Daily., Disp: , Rfl:   •  Multiple Vitamins-Minerals (MULTIVITAMIN WITH MINERALS) tablet tablet, Take 1 tablet by mouth Daily., Disp: , Rfl:   •  pregabalin (LYRICA) 100 MG capsule, Take 1 capsule by mouth Every Night for 30 days., Disp: 30 capsule, Rfl: 3  •  simvastatin (ZOCOR) 40 MG tablet, Take 40 mg by mouth Daily., Disp: , Rfl:   •  vitamin B-12 (CYANOCOBALAMIN) 1000 MCG tablet, Take 1,000 mcg by mouth Daily., Disp: , Rfl:    Past Medical History:   Diagnosis Date   • High cholesterol    • History of bradycardia    • Hypertension    • Peripheral neuropathy    • Seizures (CMS/HCC)    • Shingles    • SOB (shortness of breath) on exertion       Past Surgical History:   Procedure Laterality Date   • BRAIN SURGERY  03/09/2007    VNS Implantation (Dr. Perez)    • CARPAL TUNNEL RELEASE  11/09/2009    Left (Dr. Torres)   • CARPAL TUNNEL RELEASE  12/02/2009    Right (Dr. Torres)   • CATARACT EXTRACTION  10/16/2013    [facility]   • CATARACT EXTRACTION     • CERVICAL DISCECTOMY  "ANTERIOR  12/1989    Dr. Chavira   • COLONOSCOPY     • CORONARY ANGIOPLASTY  10/23/2013    Dr. Kent   • CRANIOTOMY  05/07/2002    Right temporal lobectomy (Dr. Perez)    • CRANIOTOMY  12/21/2010    Temporal lobectomy (Dr. Perez)    • ELBOW PROCEDURE     • KNEE CARTILAGE SURGERY      Right   • LUMBAR DISCECTOMY  09/12/2008    Microendoscopic far lateral dickectomy Left L3-4 ([place])   • PACEMAKER IMPLANTATION  07/23/2013    Dr. Kent   • POSTERIOR LUMBAR/THORACIC SPINE FUSION  10/24/2008    TLIF Left w percutaneous pedicle screw fixation L3/4 ([facility])   • VAGUS NERVE STIMULATOR GENERATOR CHANGE  06/19/2012    (Dr. Perez)    • VAGUS NERVE STIMULATOR IMPLANTATION     • VAGUS NERVE STIMULATOR IMPLANTATION Left 2/16/2017    Procedure: VAGUS NERVE STIMULATOR IMPLANTATION;  Surgeon: Christiano Lopez MD;  Location: [facility];  Service:    • VAGUS NERVE STIMULATOR IMPLANTATION        History reviewed. No pertinent family history.     Review of Systems   Neurological: Positive for numbness (Feet).   All other systems reviewed and are negative.    Objective:    /70   Pulse 56   Temp 97.6 °F (36.4 °C)   Ht 175.3 cm (69\")   Wt 98.9 kg (218 lb)   SpO2 100%   BMI 32.19 kg/m²     Neurology Exam:  General apperance: NAD.     Mental status: Alert, awake and oriented to time place and person.    Recent and Remote memory: Can recall 3/3 objects at 5 minutes. Can recall historical events.     Attention span and Concentration: Serial 7s: Normal.     Fund of knowledge:  Normal.     Language and Speech: No aphasia or dysarthria.    Naming , Repitition and Comprehension:  Can name objects, repeat a sentence and follow commands. Speech is clear and fluent with good repetition, comprehension, and naming.    CN II to XII: Intact.    Opthalmoscopic Exam: No papilledema.    Motor:  Right UE muscle strength 5/5. Normal tone.     Left UE muscle strength 5/5. Normal tone.      Right LE muscle strength5/5. Normal tone. "     Left LE muscle strength 5/5. Normal tone.      Sensory: Reduced light touch, vibration and pinprick sensation bilaterally.    DTRs: 1+ bilaterally in upper extremities, 1+ bilateral knees and absent bilateral ankles.    Babinski: Negative bilaterally.    Co-ordination: Normal finger-to-nose, heel to shin B/L.    Rhomberg: Negative.    Gait: Normal.    Cardiovascular: Regular rate and rhythm without murmur, gallop or rub.    Assessment and Plan:  1. Idiopathic peripheral neuropathy  -With 75 mg nightly dose, he has not had any improvement in symptoms.  Since he does not have any significant side effects, it will be increased to 100 mg nightly dose and see how he does.  He has gained 1 or 2 pounds and is concerned about it.  I have told him that will watch his weight closely.  Based on the response to 100 mg dose, further dose adjustments will be made on next visit.    - pregabalin (LYRICA) 100 MG capsule; Take 1 capsule by mouth Every Night for 30 days.  Dispense: 30 capsule; Refill: 3    2. Other generalized epilepsy, not intractable, without status epilepticus (CMS/HCC)  -He is status post VNS and is also on carbamazepine 400 mgTwice daily.  No breakthrough seizures on this combination.  I will plan to see him back in 8 weeks for follow-up.    I spent 25 minutes face to face with the patient and spent more than 50% of this time  in management, instructions and education regarding above mentioned diagnosis and also on counseling and discussing about taking medication regularly, possible side effects with medication use, importance of good sleep hygiene, good hydration and regular exercise.    Return in about 2 months (around 11/1/2020).

## 2020-09-25 ENCOUNTER — TRANSCRIBE ORDERS (OUTPATIENT)
Dept: LAB | Facility: HOSPITAL | Age: 70
End: 2020-09-25

## 2020-09-25 DIAGNOSIS — E78.2 MIXED HYPERLIPIDEMIA: ICD-10-CM

## 2020-09-25 DIAGNOSIS — E03.9 MYXEDEMA HEART DISEASE: Primary | ICD-10-CM

## 2020-09-25 DIAGNOSIS — I51.9 MYXEDEMA HEART DISEASE: Primary | ICD-10-CM

## 2020-09-26 ENCOUNTER — LAB (OUTPATIENT)
Dept: LAB | Facility: HOSPITAL | Age: 70
End: 2020-09-26

## 2020-09-26 DIAGNOSIS — I51.9 MYXEDEMA HEART DISEASE: ICD-10-CM

## 2020-09-26 DIAGNOSIS — E03.9 MYXEDEMA HEART DISEASE: ICD-10-CM

## 2020-09-26 DIAGNOSIS — E78.2 MIXED HYPERLIPIDEMIA: ICD-10-CM

## 2020-09-26 LAB
ALBUMIN SERPL-MCNC: 4.6 G/DL (ref 3.5–5.2)
ALBUMIN/GLOB SERPL: 1.8 G/DL
ALP SERPL-CCNC: 124 U/L (ref 39–117)
ALT SERPL W P-5'-P-CCNC: 21 U/L (ref 1–41)
ANION GAP SERPL CALCULATED.3IONS-SCNC: 8.8 MMOL/L (ref 5–15)
AST SERPL-CCNC: 19 U/L (ref 1–40)
BASOPHILS # BLD AUTO: 0.02 10*3/MM3 (ref 0–0.2)
BASOPHILS NFR BLD AUTO: 0.4 % (ref 0–1.5)
BILIRUB SERPL-MCNC: 0.3 MG/DL (ref 0–1.2)
BUN SERPL-MCNC: 13 MG/DL (ref 8–23)
BUN/CREAT SERPL: 12.3 (ref 7–25)
CALCIUM SPEC-SCNC: 9.1 MG/DL (ref 8.6–10.5)
CHLORIDE SERPL-SCNC: 100 MMOL/L (ref 98–107)
CHOLEST SERPL-MCNC: 168 MG/DL (ref 0–200)
CO2 SERPL-SCNC: 27.2 MMOL/L (ref 22–29)
CREAT SERPL-MCNC: 1.06 MG/DL (ref 0.76–1.27)
DEPRECATED RDW RBC AUTO: 45.6 FL (ref 37–54)
EOSINOPHIL # BLD AUTO: 0.08 10*3/MM3 (ref 0–0.4)
EOSINOPHIL NFR BLD AUTO: 1.8 % (ref 0.3–6.2)
ERYTHROCYTE [DISTWIDTH] IN BLOOD BY AUTOMATED COUNT: 12.9 % (ref 12.3–15.4)
GFR SERPL CREATININE-BSD FRML MDRD: 69 ML/MIN/1.73
GLOBULIN UR ELPH-MCNC: 2.6 GM/DL
GLUCOSE SERPL-MCNC: 107 MG/DL (ref 65–99)
HCT VFR BLD AUTO: 38 % (ref 37.5–51)
HDLC SERPL-MCNC: 47 MG/DL (ref 40–60)
HGB BLD-MCNC: 12.7 G/DL (ref 13–17.7)
IMM GRANULOCYTES # BLD AUTO: 0.01 10*3/MM3 (ref 0–0.05)
IMM GRANULOCYTES NFR BLD AUTO: 0.2 % (ref 0–0.5)
LDLC SERPL CALC-MCNC: 87 MG/DL (ref 0–100)
LDLC/HDLC SERPL: 1.86 {RATIO}
LYMPHOCYTES # BLD AUTO: 0.54 10*3/MM3 (ref 0.7–3.1)
LYMPHOCYTES NFR BLD AUTO: 11.8 % (ref 19.6–45.3)
MCH RBC QN AUTO: 32.1 PG (ref 26.6–33)
MCHC RBC AUTO-ENTMCNC: 33.4 G/DL (ref 31.5–35.7)
MCV RBC AUTO: 96 FL (ref 79–97)
MONOCYTES # BLD AUTO: 0.52 10*3/MM3 (ref 0.1–0.9)
MONOCYTES NFR BLD AUTO: 11.4 % (ref 5–12)
NEUTROPHILS NFR BLD AUTO: 3.39 10*3/MM3 (ref 1.7–7)
NEUTROPHILS NFR BLD AUTO: 74.4 % (ref 42.7–76)
NRBC BLD AUTO-RTO: 0 /100 WBC (ref 0–0.2)
PLATELET # BLD AUTO: 201 10*3/MM3 (ref 140–450)
PMV BLD AUTO: 10.1 FL (ref 6–12)
POTASSIUM SERPL-SCNC: 4.8 MMOL/L (ref 3.5–5.2)
PROT SERPL-MCNC: 7.2 G/DL (ref 6–8.5)
RBC # BLD AUTO: 3.96 10*6/MM3 (ref 4.14–5.8)
SODIUM SERPL-SCNC: 136 MMOL/L (ref 136–145)
T4 FREE SERPL-MCNC: 0.85 NG/DL (ref 0.93–1.7)
TRIGL SERPL-MCNC: 169 MG/DL (ref 0–150)
TSH SERPL DL<=0.05 MIU/L-ACNC: 2.32 UIU/ML (ref 0.27–4.2)
VLDLC SERPL-MCNC: 33.8 MG/DL (ref 5–40)
WBC # BLD AUTO: 4.56 10*3/MM3 (ref 3.4–10.8)

## 2020-09-26 PROCEDURE — 36415 COLL VENOUS BLD VENIPUNCTURE: CPT

## 2020-09-26 PROCEDURE — 80053 COMPREHEN METABOLIC PANEL: CPT

## 2020-09-26 PROCEDURE — 80061 LIPID PANEL: CPT

## 2020-09-26 PROCEDURE — 85025 COMPLETE CBC W/AUTO DIFF WBC: CPT

## 2020-09-26 PROCEDURE — 84443 ASSAY THYROID STIM HORMONE: CPT

## 2020-09-26 PROCEDURE — 84439 ASSAY OF FREE THYROXINE: CPT

## 2020-10-19 ENCOUNTER — TELEPHONE (OUTPATIENT)
Dept: NEUROLOGY | Facility: CLINIC | Age: 70
End: 2020-10-19

## 2020-10-19 DIAGNOSIS — G60.9 IDIOPATHIC PERIPHERAL NEUROPATHY: ICD-10-CM

## 2020-10-19 RX ORDER — PREGABALIN 100 MG/1
100 CAPSULE ORAL NIGHTLY
Qty: 90 CAPSULE | Refills: 2 | Status: SHIPPED | OUTPATIENT
Start: 2020-10-19 | End: 2021-02-25

## 2020-10-19 NOTE — TELEPHONE ENCOUNTER
PT IS CALLING TO SEE IF THE MEDICATION pregabalin (LYRICA) 100 MG capsule CAN BE FILLED FOR 90 DAYS. PLEASE CALL HIM BACK [phone] AND LET HIM KNOW IF IT CAN OR NOT. (HE IS PREPARING TO LEAVE ON A TRIP AND WANTS TO KNOW).

## 2020-10-20 NOTE — TELEPHONE ENCOUNTER
Spoke with pharmacist again and she is going to go on and refill the Rx. She stated that it may not be covered by insurance since its requested refill too early. They are calling the patient to let him know when the Rx is ready for pickup.

## 2020-10-20 NOTE — TELEPHONE ENCOUNTER
Spoke with pharmacist and she states that he is only into 21 days of his 30 day supply. Will call patient and let him know since he was giving the pharmacy a hard time.

## 2020-10-20 NOTE — TELEPHONE ENCOUNTER
PATIENT STATES THAT TONI INFORMED HIM THAT SINCE IS MEDICATION IS A CONTROLLED SUBSTANCE W/ A 90 DAY FILL THAT  WOULD NEED TO CALL AND GIVE A VERBAL IN ORDER TO FILL.    PLEASE ADVISE .     TONI [phone]

## 2021-02-25 ENCOUNTER — OFFICE VISIT (OUTPATIENT)
Dept: NEUROLOGY | Facility: CLINIC | Age: 71
End: 2021-02-25

## 2021-02-25 VITALS
HEART RATE: 63 BPM | BODY MASS INDEX: 32.18 KG/M2 | OXYGEN SATURATION: 98 % | TEMPERATURE: 97.5 F | SYSTOLIC BLOOD PRESSURE: 126 MMHG | HEIGHT: 69 IN | DIASTOLIC BLOOD PRESSURE: 72 MMHG

## 2021-02-25 DIAGNOSIS — G40.409 OTHER GENERALIZED EPILEPSY, NOT INTRACTABLE, WITHOUT STATUS EPILEPTICUS (HCC): ICD-10-CM

## 2021-02-25 DIAGNOSIS — G60.9 IDIOPATHIC PERIPHERAL NEUROPATHY: Primary | ICD-10-CM

## 2021-02-25 PROCEDURE — 99214 OFFICE O/P EST MOD 30 MIN: CPT | Performed by: PSYCHIATRY & NEUROLOGY

## 2021-02-25 RX ORDER — TOPIRAMATE 25 MG/1
25 TABLET ORAL 2 TIMES DAILY
Qty: 28 TABLET | Refills: 0 | Status: SHIPPED | OUTPATIENT
Start: 2021-02-25 | End: 2021-05-27

## 2021-02-25 RX ORDER — TOPIRAMATE 50 MG/1
50 TABLET, FILM COATED ORAL 2 TIMES DAILY
Qty: 60 TABLET | Refills: 3 | Status: SHIPPED | OUTPATIENT
Start: 2021-02-25 | End: 2021-05-27

## 2021-02-25 NOTE — PROGRESS NOTES
Subjective:    CC: Harsha Delaney is in clinic today for follow up for idiopathic peripheral neuropathy.    HPI:  Initial visit: 7/9/2020 : patient is a 69-year-old male with past medical history of generalized epilepsy status post VNS placement in 2007-well-controlled on carbamazepine 400 mg twice daily, hypothyroidism referred to the clinic for evaluation of peripheral neuropathy. He reports that he started having symptoms back in March 2019 where he noticed tingling and numbness involving the fingertips of both his hands. This slowly progressed to involve both his feet. Since then, he reports that the symptoms in his hands have remained stable but it has become significantly worse in both his feet. He reports predominantly numbness but also intermittent tingling, pain involving both his feet. He denies any problems with balance or weakness. He reports that likely at night, it does not affect his sleep. Symptoms are usually worse when he is walking or putting pressure on his feet. He has been tested for diabetes and as per him there is no evidence of diabetes. He was given a trial of low-dose gabapentin 100 to 200 mg but it did not help with the symptoms. He reports that in the past, many years ago he tried Lyrica and it caused him to gain weight. He does not remember why he was started on Lyrica. He does take carbamazepine 400 mg twice daily for epilepsy and he has not had seizures. He also has had EMG/nerve conduction study back in June 2019 which showed mild peripheral neuropathy and mild carpal cell syndrome bilaterally.     9/1/2020: He is in clinic for regular follow-up.  Since his last visit, he has now completed detailed neuropathy work-up just was normal.  He has been taking Lyrica as per my instruction currently on 75 mg nightly dose.  He reports that it has not helped with the symptoms.  At night, he gets severe numbness involving both his feet and he reports that his both feet feel extremely cold.  He  reports gaining 2 pounds since starting the medication.  He denies any other side effects.    2/25/2021: He is in clinic for regular follow-up.  Since his last visit in September, he reports that he did try Lyrica 100 mg nightly but it caused him to have significant weight gain and hence he stopped taking it about a week ago.  He reports that it did not help with neuropathy symptoms anyways.  In the past, he has tried gabapentin for a short period of time at very low-dose 100 to 200 mg daily which did not help him either.    The following portions of the patient's history were reviewed and updated as of 02/25/2021: allergies, social history and problem list.       Current Outpatient Medications:   •  carBAMazepine XR (TEGretol  XR) 400 MG 12 hr tablet, Take 400 mg by mouth 2 (Two) Times a Day., Disp: , Rfl:   •  clonazePAM (KlonoPIN) 0.5 MG tablet, Take 1.5 mg by mouth 2 (Two) Times a Day., Disp: , Rfl:   •  donepezil (ARICEPT) 10 MG tablet, Take 10 mg by mouth Daily., Disp: , Rfl:   •  levothyroxine (SYNTHROID, LEVOTHROID) 50 MCG tablet, Take 50 mcg by mouth Daily., Disp: , Rfl:   •  Multiple Vitamins-Minerals (MULTIVITAMIN WITH MINERALS) tablet tablet, Take 1 tablet by mouth Daily., Disp: , Rfl:   •  simvastatin (ZOCOR) 40 MG tablet, Take 40 mg by mouth Daily., Disp: , Rfl:   •  vitamin B-12 (CYANOCOBALAMIN) 1000 MCG tablet, Take 1,000 mcg by mouth Daily., Disp: , Rfl:   •  topiramate (Topamax) 25 MG tablet, Take 1 tablet by mouth 2 (Two) Times a Day for 14 days., Disp: 28 tablet, Rfl: 0  •  topiramate (Topamax) 50 MG tablet, Take 1 tablet by mouth 2 (Two) Times a Day for 30 days., Disp: 60 tablet, Rfl: 3   Past Medical History:   Diagnosis Date   • High cholesterol    • History of bradycardia    • Hypertension    • Peripheral neuropathy    • Seizures (CMS/HCC)    • Shingles    • SOB (shortness of breath) on exertion       Past Surgical History:   Procedure Laterality Date   • BRAIN SURGERY  03/09/2007    VNS  "Implantation (Dr. Perez)    • CARPAL TUNNEL RELEASE  11/09/2009    Left (Dr. Torres)   • CARPAL TUNNEL RELEASE  12/02/2009    Right (Dr. Torres)   • CATARACT EXTRACTION  10/16/2013    Godfrey   • CATARACT EXTRACTION     • CERVICAL DISCECTOMY ANTERIOR  12/1989    Dr. Chavira   • COLONOSCOPY     • CORONARY ANGIOPLASTY  10/23/2013    Dr. Kent   • CRANIOTOMY  05/07/2002    Right temporal lobectomy (Dr. Perez)    • CRANIOTOMY  12/21/2010    Temporal lobectomy (Dr. Perez)    • ELBOW PROCEDURE     • KNEE CARTILAGE SURGERY      Right   • LUMBAR DISCECTOMY  09/12/2008    Microendoscopic far lateral dickectomy Left L3-4 (Octaviano)   • PACEMAKER IMPLANTATION  07/23/2013    Dr. Kent   • POSTERIOR LUMBAR/THORACIC SPINE FUSION  10/24/2008    TLIF Left w percutaneous pedicle screw fixation L3/4 (Bean)   • VAGUS NERVE STIMULATOR GENERATOR CHANGE  06/19/2012    (Dr. Perez)    • VAGUS NERVE STIMULATOR IMPLANTATION     • VAGUS NERVE STIMULATOR IMPLANTATION Left 2/16/2017    Procedure: VAGUS NERVE STIMULATOR IMPLANTATION;  Surgeon: Christiano Lopez MD;  [facility];  Service:    • VAGUS NERVE STIMULATOR IMPLANTATION        History reviewed. No pertinent family history.     Review of Systems   Constitutional: Negative.    HENT: Negative.    Eyes: Negative.    Respiratory: Negative.    Cardiovascular: Negative.    Gastrointestinal: Negative.    Endocrine: Negative.    Genitourinary: Negative.    Musculoskeletal: Negative.    Skin: Negative.    Allergic/Immunologic: Negative.    Neurological: Positive for numbness.   Hematological: Negative.    Psychiatric/Behavioral: Negative.      Objective:    /72   Pulse 63   Temp 97.5 °F (36.4 °C)   Ht 175.3 cm (69.02\")   SpO2 98%   BMI 32.18 kg/m²     Neurology Exam:  General apperance: NAD.     Mental status: Alert, awake and oriented to time place and person.    Recent and Remote memory: Can recall 3/3 objects at 5 minutes. Can recall historical events. "     Attention span and Concentration: Serial 7s: Normal.     Fund of knowledge:  Normal.     Language and Speech: No aphasia or dysarthria.    Naming , Repitition and Comprehension:  Can name objects, repeat a sentence and follow commands. Speech is clear and fluent with good repetition, comprehension, and naming.    CN II to XII: Intact.    Opthalmoscopic Exam: No papilledema.    Motor:  Right UE muscle strength 5/5. Normal tone.     Left UE muscle strength 5/5. Normal tone.      Right LE muscle strength5/5. Normal tone.     Left LE muscle strength 5/5. Normal tone.      Sensory: Normal light touch, vibration and pinprick sensation bilaterally.    DTRs: 2+ bilaterally.    Babinski: Negative bilaterally.    Co-ordination: Normal finger-to-nose, heel to shin B/L.    Rhomberg: Negative.    Gait: Normal.    Cardiovascular: Regular rate and rhythm without murmur, gallop or rub.    Assessment and Plan:  1. Idiopathic peripheral neuropathy  2. Other generalized epilepsy, not intractable, without status epilepticus (CMS/HCC)  -He could not tolerate Lyrica because of significant weight gain and hence has stopped taking it about a week ago.  It did not help with neuropathy symptoms anyways.  In the past, he has tried low-dose gabapentin and does not wish to try it again.  He continues to take Tegretol 400 mg twice daily for epilepsy and has done well.  I am going to give him a trial of Topamax 25 mg slowly increasing to 50 mg daily dose for neuropathy and see how he does.  Common side effects including weight loss, metallic taste and rarely kidney stone was explained to the patient.  Based on the response to Topamax, further dose adjustment will be made.  We will plan to see him back in 3 months for follow-up.       I spent 25 minutes face to face with the patient and spent more than 50% of this time  in management, instructions and education regarding above mentioned diagnosis and also on counseling and discussing about  taking medication regularly, possible side effects with medication use, importance of good sleep hygiene, good hydration and regular exercise.    Return in about 3 months (around 5/25/2021).

## 2021-03-16 ENCOUNTER — TELEPHONE (OUTPATIENT)
Dept: NEUROLOGY | Facility: CLINIC | Age: 71
End: 2021-03-16

## 2021-03-16 NOTE — TELEPHONE ENCOUNTER
I would continue with 50 mg twice a day as per schedule.  The sleepiness that he is experiencing will subside eventually.

## 2021-03-16 NOTE — TELEPHONE ENCOUNTER
PT HAS CALLED PER REQUEST OF DR. PLATA TO GIVE 3 WK UPDATE ON TOPAMAX MEDICATION. PT STATES THAT HE HASN'T NOTICED MUCH CHANGE IN ISSUES BUT HAS NOTICED AN INCREASE IN THE AMOUNT OF SLEEP HE IS GETTING. HE STATES THAT HE WILL BE STARTING THE 50MG DOSAGE 2X DAILY TOMORROW AND IS CONCERNED ABOUT HOW SLEEPY HE MAY BE DUE TO SIDE EFFECTS.    PT CAN BE REACHED AT [phone].    PLEASE REVIEW AND ADVISE.

## 2021-05-27 ENCOUNTER — OFFICE VISIT (OUTPATIENT)
Dept: NEUROLOGY | Facility: CLINIC | Age: 71
End: 2021-05-27

## 2021-05-27 VITALS
HEART RATE: 48 BPM | WEIGHT: 210 LBS | OXYGEN SATURATION: 100 % | HEIGHT: 69 IN | TEMPERATURE: 96.9 F | BODY MASS INDEX: 31.1 KG/M2

## 2021-05-27 DIAGNOSIS — G40.409 OTHER GENERALIZED EPILEPSY, NOT INTRACTABLE, WITHOUT STATUS EPILEPTICUS (HCC): ICD-10-CM

## 2021-05-27 DIAGNOSIS — G60.9 IDIOPATHIC PERIPHERAL NEUROPATHY: Primary | ICD-10-CM

## 2021-05-27 PROCEDURE — 99214 OFFICE O/P EST MOD 30 MIN: CPT | Performed by: PSYCHIATRY & NEUROLOGY

## 2021-05-27 NOTE — PROGRESS NOTES
Subjective:    CC: Harsha Delaney is in clinic today for follow up for idiopathic peripheral neuropathy.    HPI:  Initial visit: 7/9/2020 : patient is a 69-year-old male with past medical history of generalized epilepsy status post VNS placement in 2007-well-controlled on carbamazepine 400 mg twice daily, hypothyroidism referred to the clinic for evaluation of peripheral neuropathy. He reports that he started having symptoms back in March 2019 where he noticed tingling and numbness involving the fingertips of both his hands. This slowly progressed to involve both his feet. Since then, he reports that the symptoms in his hands have remained stable but it has become significantly worse in both his feet. He reports predominantly numbness but also intermittent tingling, pain involving both his feet. He denies any problems with balance or weakness. He reports that likely at night, it does not affect his sleep. Symptoms are usually worse when he is walking or putting pressure on his feet. He has been tested for diabetes and as per him there is no evidence of diabetes. He was given a trial of low-dose gabapentin 100 to 200 mg but it did not help with the symptoms. He reports that in the past, many years ago he tried Lyrica and it caused him to gain weight. He does not remember why he was started on Lyrica. He does take carbamazepine 400 mg twice daily for epilepsy and he has not had seizures. He also has had EMG/nerve conduction study back in June 2019 which showed mild peripheral neuropathy and mild carpal cell syndrome bilaterally.     9/1/2020: He is in clinic for regular follow-up.  Since his last visit, he has now completed detailed neuropathy work-up just was normal.  He has been taking Lyrica as per my instruction currently on 75 mg nightly dose.  He reports that it has not helped with the symptoms.  At night, he gets severe numbness involving both his feet and he reports that his both feet feel extremely cold.  He  reports gaining 2 pounds since starting the medication.  He denies any other side effects.    2/25/2021: He is in clinic for regular follow-up.  Since his last visit in September, he reports that he did try Lyrica 100 mg nightly but it caused him to have significant weight gain and hence he stopped taking it about a week ago.  He reports that it did not help with neuropathy symptoms anyways.  In the past, he has tried gabapentin for a short period of time at very low-dose 100 to 200 mg daily which did not help him either.    5/27/2021: He is in clinic for regular follow-up.  Since his last visit in February, he did try Topamax as per my instruction but it did not help and it was making him too sleepy so he ultimately stopped taking the medication.  He reports that he continues to have predominant numbness involving the top and the bottom of both his feet extending up to the ankle.  He reports that it does bother him but not to the point where he cannot do ADLs.  He has now tried and failed Lyrica, gabapentin and Topamax.  He is on Tegretol for seizure prevention and doing well without any side effects.  Seizures remain under good control.      The following portions of the patient's history were reviewed and updated as of 05/27/2021: allergies, social history and problem list.       Current Outpatient Medications:   •  carBAMazepine XR (TEGretol  XR) 400 MG 12 hr tablet, Take 400 mg by mouth 2 (Two) Times a Day., Disp: , Rfl:   •  clonazePAM (KlonoPIN) 0.5 MG tablet, Take 1.5 mg by mouth 2 (Two) Times a Day., Disp: , Rfl:   •  donepezil (ARICEPT) 10 MG tablet, Take 10 mg by mouth Daily., Disp: , Rfl:   •  levothyroxine (SYNTHROID, LEVOTHROID) 50 MCG tablet, Take 50 mcg by mouth Daily., Disp: , Rfl:   •  Multiple Vitamins-Minerals (MULTIVITAMIN WITH MINERALS) tablet tablet, Take 1 tablet by mouth Daily., Disp: , Rfl:   •  simvastatin (ZOCOR) 40 MG tablet, Take 40 mg by mouth Daily., Disp: , Rfl:   •  vitamin B-12  (CYANOCOBALAMIN) 1000 MCG tablet, Take 1,000 mcg by mouth Daily., Disp: , Rfl:    Past Medical History:   Diagnosis Date   • High cholesterol    • History of bradycardia    • Hypertension    • Peripheral neuropathy    • Seizures (CMS/HCC)    • Shingles    • SOB (shortness of breath) on exertion       Past Surgical History:   Procedure Laterality Date   • BRAIN SURGERY  03/09/2007    VNS Implantation (Dr. Perez)    • CARPAL TUNNEL RELEASE  11/09/2009    Left (Dr. Torres)   • CARPAL TUNNEL RELEASE  12/02/2009    Right (Dr. Torres)   • CATARACT EXTRACTION  10/16/2013    Godfrey   • CATARACT EXTRACTION     • CERVICAL DISCECTOMY ANTERIOR  12/1989    Dr. Chavira   • COLONOSCOPY     • CORONARY ANGIOPLASTY  10/23/2013    Dr. Kent   • CRANIOTOMY  05/07/2002    Right temporal lobectomy (Dr. Perez)    • CRANIOTOMY  12/21/2010    Temporal lobectomy (Dr. Perez)    • ELBOW PROCEDURE     • KNEE CARTILAGE SURGERY      Right   • LUMBAR DISCECTOMY  09/12/2008    Microendoscopic far lateral dickectomy Left L3-4 (Octaviano)   • PACEMAKER IMPLANTATION  07/23/2013    Dr. Kent   • POSTERIOR LUMBAR/THORACIC SPINE FUSION  10/24/2008    TLIF Left w percutaneous pedicle screw fixation L3/4 (Bean)   • VAGUS NERVE STIMULATOR GENERATOR CHANGE  06/19/2012    (Dr. Perez)    • VAGUS NERVE STIMULATOR IMPLANTATION     • VAGUS NERVE STIMULATOR IMPLANTATION Left 2/16/2017    Procedure: VAGUS NERVE STIMULATOR IMPLANTATION;  Surgeon: Christiano Lopez MD;  Location[facility];  Service:    • VAGUS NERVE STIMULATOR IMPLANTATION        History reviewed. No pertinent family history.     Review of Systems   Constitutional: Negative.    HENT: Negative.    Eyes: Negative.    Respiratory: Negative.    Cardiovascular: Negative.    Gastrointestinal: Negative.    Endocrine: Negative.    Genitourinary: Negative.    Musculoskeletal: Negative.    Skin: Negative.    Allergic/Immunologic: Negative.    Neurological: Positive for numbness.  "  Hematological: Negative.    Psychiatric/Behavioral: Negative.      Objective:    Pulse (!) 48   Temp 96.9 °F (36.1 °C)   Ht 175.3 cm (69\")   Wt 95.3 kg (210 lb)   SpO2 100%   BMI 31.01 kg/m²     Neurology Exam:  General apperance: NAD.     Mental status: Alert, awake and oriented to time place and person.    Recent and Remote memory: Can recall 3/3 objects at 5 minutes. Can recall historical events.     Attention span and Concentration: Serial 7s: Normal.     Fund of knowledge:  Normal.     Language and Speech: No aphasia or dysarthria.    Naming , Repitition and Comprehension:  Can name objects, repeat a sentence and follow commands. Speech is clear and fluent with good repetition, comprehension, and naming.    CN II to XII: Intact.    Opthalmoscopic Exam: No papilledema.    Motor:  Right UE muscle strength 5/5. Normal tone.     Left UE muscle strength 5/5. Normal tone.      Right LE muscle strength5/5. Normal tone.     Left LE muscle strength 5/5. Normal tone.      Sensory: Reduced light touch, vibration and pinprick sensation bilaterally.    DTRs: 1+ bilaterally.    Babinski: Negative bilaterally.    Co-ordination: Normal finger-to-nose, heel to shin B/L.    Rhomberg: Negative.    Gait: Normal.    Cardiovascular: Regular rate and rhythm without murmur, gallop or rub.    Assessment and Plan:  1. Idiopathic peripheral neuropathy  2. Other generalized epilepsy, not intractable, without status epilepticus (CMS/HCC)  -He has now tried and failed Lyrica, gabapentin and lastly Topamax.  All medications caused him to have side effects and it was not really beneficial for neuropathy symptoms.  I am going to prescribe him compounded cream to be applied locally that may help reduce some of the neuropathy symptoms without any systemic side effects.  Continue with Tegretol 400 mg twice daily for seizure prevention and I will see him back in clinic in 6 months for follow-up.    I spent 30 minutes face to face with the " patient and spent more than 50% of this time  in management, instructions and education regarding above mentioned diagnosis and also on counseling and discussing about taking medication regularly, possible side effects with medication use, importance of good sleep hygiene, good hydration and regular exercise.    Return in about 6 months (around 11/27/2021).

## 2021-05-28 ENCOUNTER — TELEPHONE (OUTPATIENT)
Dept: NEUROLOGY | Facility: CLINIC | Age: 71
End: 2021-05-28

## 2021-05-28 NOTE — TELEPHONE ENCOUNTER
Caller: Harsha Delaney    Relationship: Self    Best call back number:[phone]    What medications are you currently taking:   Current Outpatient Medications on File Prior to Visit   Medication Sig Dispense Refill   • carBAMazepine XR (TEGretol  XR) 400 MG 12 hr tablet Take 400 mg by mouth 2 (Two) Times a Day.     • clonazePAM (KlonoPIN) 0.5 MG tablet Take 1.5 mg by mouth 2 (Two) Times a Day.     • donepezil (ARICEPT) 10 MG tablet Take 10 mg by mouth Daily.     • levothyroxine (SYNTHROID, LEVOTHROID) 50 MCG tablet Take 50 mcg by mouth Daily.     • Multiple Vitamins-Minerals (MULTIVITAMIN WITH MINERALS) tablet tablet Take 1 tablet by mouth Daily.     • simvastatin (ZOCOR) 40 MG tablet Take 40 mg by mouth Daily.     • vitamin B-12 (CYANOCOBALAMIN) 1000 MCG tablet Take 1,000 mcg by mouth Daily.       No current facility-administered medications on file prior to visit.          What are your concerns: HASN'T CALLED RX IN YET     PT CALLED IN AND STATED THAT DR PLATA WAS GOING TO CALL CREAM RX IN   FOR NEUROPATHY  FOR FEET. .    RX IS TO GO TO [facility]    [facility] - [place], [place] - [facility] [address] - [phone]  - [phone]   [phone]    PT WOULD LIKE TO KNOW IF RX IS  HAS BEEN CALLED IN .     PLEASE ADVISE

## 2021-05-28 NOTE — TELEPHONE ENCOUNTER
[facility]   PH: [phone]  Fax: [phone]    [facility] CALLED IN BECAUSE SHE STATED THAT THEY STILL HAVE NOT RECEIVED THE RX FOR THE PAIN CREAM TO TREAT THE PT'S NEUROPATHY ON HIS FEET. PLEASE GET THIS SENT TO THEM ASAP.

## 2021-10-22 DIAGNOSIS — Z01.812 BLOOD TESTS PRIOR TO TREATMENT OR PROCEDURE: Primary | ICD-10-CM

## 2021-10-25 ENCOUNTER — CLINICAL SUPPORT NO REQUIREMENTS (OUTPATIENT)
Dept: PULMONOLOGY | Facility: CLINIC | Age: 71
End: 2021-10-25

## 2021-10-25 DIAGNOSIS — Z01.812 BLOOD TESTS PRIOR TO TREATMENT OR PROCEDURE: ICD-10-CM

## 2021-10-25 PROCEDURE — U0004 COV-19 TEST NON-CDC HGH THRU: HCPCS | Performed by: INTERNAL MEDICINE

## 2021-10-25 PROCEDURE — U0005 INFEC AGEN DETEC AMPLI PROBE: HCPCS | Performed by: INTERNAL MEDICINE

## 2021-10-25 PROCEDURE — 99211 OFF/OP EST MAY X REQ PHY/QHP: CPT | Performed by: INTERNAL MEDICINE

## 2021-10-26 LAB — SARS-COV-2 RNA NOSE QL NAA+PROBE: NOT DETECTED

## 2021-10-27 ENCOUNTER — OFFICE VISIT (OUTPATIENT)
Dept: PULMONOLOGY | Facility: CLINIC | Age: 71
End: 2021-10-27

## 2021-10-27 VITALS
WEIGHT: 211 LBS | DIASTOLIC BLOOD PRESSURE: 84 MMHG | SYSTOLIC BLOOD PRESSURE: 126 MMHG | HEART RATE: 52 BPM | HEIGHT: 69 IN | OXYGEN SATURATION: 98 % | TEMPERATURE: 98 F | BODY MASS INDEX: 31.25 KG/M2

## 2021-10-27 DIAGNOSIS — R49.0 HOARSENESS: ICD-10-CM

## 2021-10-27 DIAGNOSIS — R06.09 DYSPNEA ON EXERTION: ICD-10-CM

## 2021-10-27 DIAGNOSIS — R06.02 SHORTNESS OF BREATH: Primary | ICD-10-CM

## 2021-10-27 PROCEDURE — 99215 OFFICE O/P EST HI 40 MIN: CPT | Performed by: INTERNAL MEDICINE

## 2021-10-27 PROCEDURE — 94726 PLETHYSMOGRAPHY LUNG VOLUMES: CPT | Performed by: INTERNAL MEDICINE

## 2021-10-27 PROCEDURE — 94729 DIFFUSING CAPACITY: CPT | Performed by: INTERNAL MEDICINE

## 2021-10-27 PROCEDURE — 94375 RESPIRATORY FLOW VOLUME LOOP: CPT | Performed by: INTERNAL MEDICINE

## 2021-10-27 NOTE — PROGRESS NOTES
Initial Pulmonary Consult Note    Subjective   Reason for consultation: Exertional dyspnea    Harsha Delaney is a 71 y.o. male is being seen for consultation today at the request of Columba Walden MD    History of Present Illness     71 y.o. with h/o HTN, Seizure d/o s/p vagal nerve stimulator, PPM (Dr. Kent), referred for dypsnea.  Patient reports shortness of breath with activity.  He gets very dyspneic with activities such as mowing lawn or vacuuming.  He denies chest pain.  He denies wheezing.  He does report a history of pneumonia the past 2 years.  Patient is a lifetime non-smoker.  Patient also reports some hoarseness which is sometimes worse than baseline.  He feels like he has tightness in his neck when he is short of breath, like he has difficulty getting air into his lungs.    Patient was seen back in 2013 with similar complaint by Dr. Valdez.  At that time, he had a normal myocardial perfusion scan and echocardiogram.  He also had a normal fluoroscopic sniff test with no evidence of diaphragm dysfunction..    The following portions of the patient's history were reviewed and updated as appropriate: allergies, current medications, past family history, past medical history, past social history, past surgical history and problem list.    Active Ambulatory Problems     Diagnosis Date Noted   • Seizure disorder (HCC) 02/16/2017   • Dyspnea on exertion 10/27/2021   • Hoarseness 10/27/2021     Resolved Ambulatory Problems     Diagnosis Date Noted   • No Resolved Ambulatory Problems     Past Medical History:   Diagnosis Date   • High cholesterol    • History of bradycardia    • Hypertension    • Peripheral neuropathy    • Seizures (HCC)    • Shingles    • SOB (shortness of breath) on exertion        Past Surgical History:   Procedure Laterality Date   • BRAIN SURGERY  03/09/2007    VNS Implantation (Dr. Perez) [facility]   • CARPAL TUNNEL RELEASE  11/09/2009    Left (Dr. Torres)   • CARPAL TUNNEL RELEASE   12/02/2009    Right (Dr. Torres)   • CATARACT EXTRACTION  10/16/2013    [facility]   • CATARACT EXTRACTION     • CERVICAL DISCECTOMY ANTERIOR  12/1989    Dr. Chavira   • COLONOSCOPY     • CORONARY ANGIOPLASTY  10/23/2013    Dr. Kent   • CRANIOTOMY  05/07/2002    Right temporal lobectomy (Dr. Perez)    • CRANIOTOMY  12/21/2010    Temporal lobectomy (Dr. Perez)    • ELBOW PROCEDURE     • KNEE CARTILAGE SURGERY      Right   • LUMBAR DISCECTOMY  09/12/2008    Microendoscopic far lateral dickectomy Left L3-4 (Octaviano)   • PACEMAKER IMPLANTATION  07/23/2013    Dr. Kent   • POSTERIOR LUMBAR/THORACIC SPINE FUSION  10/24/2008    TLIF Left w percutaneous pedicle screw fixation L3/4 (Bean)   • VAGUS NERVE STIMULATOR GENERATOR CHANGE  06/19/2012    (Dr. Perez)    • VAGUS NERVE STIMULATOR IMPLANTATION     • VAGUS NERVE STIMULATOR IMPLANTATION Left 2/16/2017    Procedure: VAGUS NERVE STIMULATOR IMPLANTATION;  Surgeon: Christiano Lopez MD;  Location: [facility];  Service:    • VAGUS NERVE STIMULATOR IMPLANTATION         History reviewed. No pertinent family history.    Social History     Socioeconomic History   • Marital status:    Tobacco Use   • Smoking status: Never Smoker   • Smokeless tobacco: Never Used   Vaping Use   • Vaping Use: Never used   Substance and Sexual Activity   • Alcohol use: No   • Drug use: No   • Sexual activity: Defer       No Known Allergies    Current Outpatient Medications   Medication Sig Dispense Refill   • carBAMazepine XR (TEGretol  XR) 400 MG 12 hr tablet Take 400 mg by mouth 2 (Two) Times a Day.     • clonazePAM (KlonoPIN) 0.5 MG tablet Take 1.5 mg by mouth 2 (Two) Times a Day.     • donepezil (ARICEPT) 10 MG tablet Take 10 mg by mouth Daily.     • levothyroxine (SYNTHROID, LEVOTHROID) 50 MCG tablet Take 50 mcg by mouth Daily.     • Multiple Vitamins-Minerals (MULTIVITAMIN WITH MINERALS) tablet tablet Take 1 tablet by mouth Daily.     • simvastatin (ZOCOR) 40 MG tablet Take 40 mg  "by mouth Daily.     • vitamin B-12 (CYANOCOBALAMIN) 1000 MCG tablet Take 1,000 mcg by mouth Daily.       No current facility-administered medications for this visit.       Review of Systems  All other systems were reviewed and are negative.  Exceptions are included in the HPI or as otherwise noted above.     Objective   Blood pressure 126/84, pulse 52, temperature 98 °F (36.7 °C), height 175.3 cm (69\"), weight 95.7 kg (211 lb), SpO2 98 %.  Physical Exam    Physical Exam:     Constitutional:    Alert, cooperative, in no acute distress   Head:    Normocephalic, without obvious abnormality, atraumatic   Eyes:            Lids and lashes normal, conjunctivae and sclerae normal, no   icterus, no pallor, corneas clear, PER   ENMT:   Ears appear intact with no abnormalities noted         Neck:   No adenopathy, supple, trachea midline, no thyromegaly, no JVD       Lungs/Resp:     Normal effort, symmetric chest rise, no crepitus, clear to      auscultation bilaterally, no chest wall tenderness                 Heart/CV:    Regular rhythm and normal rate, normal S1 and S2, no            murmur   Abdomen/GI:     Soft, non-tender, non-distended, normal bowel sounds   :     Deferred   Extremities/MSK:   No clubbing or cyanosis.  No edema.  Normal tone.  Scarring present on left elbow.   Pulses:   Pulses palpable and equal bilaterally   Skin:   No bleeding, bruising or rash   Heme/Lymph:   No cervical or supraclavicular adenopathy.    Neurologic:    Psychiatric:       Moves all extremities with no obvious focal motor deficit.  Cranial nerves 2 - 12 grossly intact   Oriented x 3, normal affect.          The above findings are documentation my personal physical examination from today.  Electronically signed by:Justin Issa MD 10/27/21 14:37 EDT      PFTs:  Full pulmonary function testing was done on 10/27/2021.  Please see PFT report for details.  FVC was 4.75 L or 113 percent predicted.  FEV1 was 3.52 L or 114 percent " predicted.  FEV1 to FVC ratio was 74 percent.  Maximal voluntary ventilation 68 L/min or 65 percent predicted.  Total lung capacity 6.95 L or 111 percent predicted.  Residual volume 2.20 L or 88 percent predicted.  DLCO 99 percent predicted.    Interpretation: No obstruction.  No restriction.  No air trapping or hyperinflation.  Low maximal voluntary ventilation which may be effort related.  Normal DLCO.  No prior study available for immediate comparison.    Imaging:  Chest x-ray PA and lateral    Study date: 10/27/2021    Indication: Dyspnea    Comparison: Prior chest x-ray from March 11, 2020    Interpretation: Trachea is midline.  Main giovany is slightly splayed.  Borderline cardiomegaly.  No pleural effusion or pneumothorax.  There are moderate degenerative changes to the thoracic spine.  There is a left-sided vagal nerve stimulator and a right-sided permanent pacemaker in place.  No significant change from prior study.    Impression: No acute radiographic chest abnormality.    I also reviewed the patient's CT angiogram of the chest from December 5, 2013.  There is no evidence of pulmonary embolism and no significant parenchymal abnormality.  There is a right apical calcified granuloma.    Assessment/Plan   Problems Addressed this Visit        Cardiac and Vasculature    Dyspnea on exertion    Relevant Orders    Ambulatory Referral to Cardiology       ENT    Hoarseness    Relevant Orders    Ambulatory Referral to ENT (Otolaryngology)      Other Visit Diagnoses     Shortness of breath    -  Primary    Relevant Orders    XR Chest PA & Lateral    Pulmonary Function Test (Completed)    Ambulatory Referral to ENT (Otolaryngology)      Diagnoses       Codes Comments    Shortness of breath    -  Primary ICD-10-CM: R06.02  ICD-9-CM: 786.05     Dyspnea on exertion     ICD-10-CM: R06.00  ICD-9-CM: 786.09     Hoarseness     ICD-10-CM: R49.0  ICD-9-CM: 784.42           Discussion:  71 y.o. very pleasant male lifetime  non-smoker with history of vagal nerve stimulator for seizure disorder, hypertension, permanent pacemaker placement, is here for evaluation of exertional dyspnea.    Pulmonary function testing is within normal limits, actually on the high side of normal.  He has normal diffusion capacity.  Previously had a CT scan of the chest when he was seen in 2013 with similar complaints that showed no significant parenchymal abnormality.  At that time he also had a normal fluoroscopic sniff test with no evidence of diaphragm dysfunction.  Echocardiogram and nuclear stress test were also within normal limits.    The patient does have some hoarseness.  He describes some tightness in his throat associated with his episodes of shortness of breath.  I am curious as to whether or not he has paralysis of his left vocal cord associated with his vagal nerve stimulator.  He does not seem to have any fixed obstruction on his flow volume loop however.  Nonetheless I think would be reasonable to further evaluate his upper airway to make sure there is no evidence of significant obstructive process and evaluate his vocal cords.  I will refer him to ENT for this.    Another potential explanation for exertional dyspnea could be an intracardiac or intrapulmonary shunt so I would like him to have an echo with bubble study.  Alternatively he could be having anginal type symptoms so I will refer him to cardiology for consideration of stress echo with bubble study.  He follows with Dr. Kent.    I will see him back after the above evaluation is done.  I do not think that he has any significant pulmonary parenchymal process to account for his dyspnea.    Ultimately, his dyspnea could be related to deconditioning, and if work-up is negative, I will likely recommend regular moderate aerobic exercise of 25 to 30 minutes at least 4 times per week.         I personally spent a total of 62 minutes on patient visit today including record review, review of  images and testing with the patient, face to face with the patient in counseling and discussion and/or coordination of care, discussion of plan for further evaluation, discussion of follow-up plan, and documentation.     Electronically signed by:  Justin Issa MD  10/27/21  12:31 EDT    • Please note that portions of this note were completed with [employer] - a voice recognition program.

## 2021-11-22 ENCOUNTER — TELEPHONE (OUTPATIENT)
Dept: NEUROLOGY | Facility: CLINIC | Age: 71
End: 2021-11-22

## 2021-11-22 NOTE — TELEPHONE ENCOUNTER
DR. BONI MCKEON    SELF    [phone]    SAYS IS CURRENTLY USING THE NEUROPATHY CREAM AND THAT IT'S WORKING VERY WELL. WOULD LIKE TO KNOW IF 11-29 APPT IS NECESSARY OR IF HE CAN START COMING YEARLY. PLEASE ADVISE AND I CAN CALL HIM BACK

## 2021-11-23 NOTE — TELEPHONE ENCOUNTER
Can you send to [facility] because [facility] never receives the faxes and can not see the medication is approved.

## 2021-11-24 NOTE — TELEPHONE ENCOUNTER
Is it for any medication?  Or he is asking for yearly follow-up.  He feels for yearly follow-up than yes it is okay to change it to once a year follow-up.  Thanks

## 2021-12-14 ENCOUNTER — LAB (OUTPATIENT)
Dept: LAB | Facility: HOSPITAL | Age: 71
End: 2021-12-14

## 2021-12-14 ENCOUNTER — TRANSCRIBE ORDERS (OUTPATIENT)
Dept: LAB | Facility: HOSPITAL | Age: 71
End: 2021-12-14

## 2021-12-14 DIAGNOSIS — Z00.00 ROUTINE GENERAL MEDICAL EXAMINATION AT A HEALTH CARE FACILITY: Primary | ICD-10-CM

## 2021-12-14 LAB
BUN SERPL-MCNC: 15 MG/DL (ref 8–23)
CREAT SERPL-MCNC: 1.04 MG/DL (ref 0.76–1.27)
GFR SERPL CREATININE-BSD FRML MDRD: 70 ML/MIN/1.73

## 2021-12-14 PROCEDURE — 36415 COLL VENOUS BLD VENIPUNCTURE: CPT | Performed by: OTOLARYNGOLOGY

## 2021-12-14 PROCEDURE — 82565 ASSAY OF CREATININE: CPT | Performed by: OTOLARYNGOLOGY

## 2021-12-14 PROCEDURE — 84520 ASSAY OF UREA NITROGEN: CPT | Performed by: OTOLARYNGOLOGY

## 2021-12-22 ENCOUNTER — OFFICE VISIT (OUTPATIENT)
Dept: PULMONOLOGY | Facility: CLINIC | Age: 71
End: 2021-12-22

## 2021-12-22 VITALS
DIASTOLIC BLOOD PRESSURE: 84 MMHG | TEMPERATURE: 97.8 F | OXYGEN SATURATION: 98 % | WEIGHT: 212.4 LBS | BODY MASS INDEX: 31.46 KG/M2 | HEART RATE: 53 BPM | HEIGHT: 69 IN | SYSTOLIC BLOOD PRESSURE: 140 MMHG

## 2021-12-22 DIAGNOSIS — R06.09 DYSPNEA ON EXERTION: Primary | ICD-10-CM

## 2021-12-22 DIAGNOSIS — G40.909 SEIZURE DISORDER (HCC): ICD-10-CM

## 2021-12-22 DIAGNOSIS — I10 ESSENTIAL HYPERTENSION: ICD-10-CM

## 2021-12-22 DIAGNOSIS — R49.0 HOARSENESS: ICD-10-CM

## 2021-12-22 PROCEDURE — 99215 OFFICE O/P EST HI 40 MIN: CPT | Performed by: INTERNAL MEDICINE

## 2021-12-22 PROCEDURE — 94618 PULMONARY STRESS TESTING: CPT | Performed by: INTERNAL MEDICINE

## 2021-12-22 RX ORDER — MULTIVIT WITH MINERALS/LUTEIN
1000 TABLET ORAL DAILY
COMMUNITY

## 2021-12-22 NOTE — PROGRESS NOTES
Initial Pulmonary Consult Note    Subjective   Reason for consultation: Exertional dyspnea    Harsha Delaney is a 71 y.o. male is being seen for consultation today at the request of     History of Present Illness     71 y.o. with h/o HTN, Seizure d/o s/p vagal nerve stimulator, PPM (Dr. Kent), referred for dypsnea.  Patient reports shortness of breath with activity.  He gets very dyspneic with activities such as mowing lawn or vacuuming.  He denies chest pain.  He denies wheezing.  He does report a history of pneumonia the past 2 years.  Patient is a lifetime non-smoker.  Patient also reports some hoarseness which is sometimes worse than baseline.  He feels like he has tightness in his neck when he is short of breath, like he has difficulty getting air into his lungs.    Patient was seen back in 2013 with similar complaint by Dr. Valdez.  At that time, he had a normal myocardial perfusion scan and echocardiogram.  He also had a normal fluoroscopic sniff test with no evidence of diaphragm dysfunction.    Interval history:    Patient is here for follow-up. Symptoms are unchanged. He saw the ENT who made a referral to  laryngology for further evaluation of partial bilateral vocal cord paralysis. Additionally, ordered a CT scan of the neck to evaluate for underlying cause of right-sided vocal cord weakness. This is scheduled for tomorrow. He further describes symptoms which sound more like he is having difficulty trying to get air into his lungs through his vocal cords and this happens mainly at maximal exertion such as climbing steps. The patient has not been contacted by cardiology yet for evaluation, I had referred him for possible stress echocardiogram with bubble study. He is accompanied by his wife, Génesis, today who basically confirms his symptoms.    The following portions of the patient's history were reviewed and updated as appropriate: allergies, current medications, past family history, past medical  history, past social history, past surgical history and problem list.    Active Ambulatory Problems     Diagnosis Date Noted   • Seizure disorder (HCC) 02/16/2017   • Dyspnea on exertion 10/27/2021   • Hoarseness 10/27/2021   • Essential hypertension 12/22/2021     Resolved Ambulatory Problems     Diagnosis Date Noted   • No Resolved Ambulatory Problems     Past Medical History:   Diagnosis Date   • High cholesterol    • History of bradycardia    • Hypertension    • Peripheral neuropathy    • Seizures (HCC)    • Shingles    • SOB (shortness of breath) on exertion        Past Surgical History:   Procedure Laterality Date   • BRAIN SURGERY  03/09/2007    VNS Implantation (Dr. Perez)    • CARPAL TUNNEL RELEASE  11/09/2009    Left (Dr. Torres)   • CARPAL TUNNEL RELEASE  12/02/2009    Right (Dr. Torres)   • CATARACT EXTRACTION  10/16/2013    Epifanio   • CATARACT EXTRACTION     • CERVICAL DISCECTOMY ANTERIOR  12/1989    Dr. Chavira   • COLONOSCOPY     • CORONARY ANGIOPLASTY  10/23/2013    Dr. Kent   • CRANIOTOMY  05/07/2002    Right temporal lobectomy (Dr. Perez)    • CRANIOTOMY  12/21/2010    Temporal lobectomy (Dr. Perez)    • ELBOW PROCEDURE     • KNEE CARTILAGE SURGERY      Right   • LUMBAR DISCECTOMY  09/12/2008    Microendoscopic far lateral dickectomy Left L3-4 (Octaviano)   • PACEMAKER IMPLANTATION  07/23/2013    Dr. Kent   • POSTERIOR LUMBAR/THORACIC SPINE FUSION  10/24/2008    TLIF Left w percutaneous pedicle screw fixation L3/4 (Bean)   • VAGUS NERVE STIMULATOR GENERATOR CHANGE  06/19/2012    (Dr. Perez)    • VAGUS NERVE STIMULATOR IMPLANTATION     • VAGUS NERVE STIMULATOR IMPLANTATION Left 2/16/2017    Procedure: VAGUS NERVE STIMULATOR IMPLANTATION;  Surgeon: Christiano Lopez MD;  Location: [facility];  Service:    • VAGUS NERVE STIMULATOR IMPLANTATION         No family history on file.    Social History     Socioeconomic History   • Marital status:    Tobacco Use   • Smoking status:  "Never Smoker   • Smokeless tobacco: Never Used   Vaping Use   • Vaping Use: Never used   Substance and Sexual Activity   • Alcohol use: No   • Drug use: No   • Sexual activity: Defer       No Known Allergies    Current Outpatient Medications   Medication Sig Dispense Refill   • ascorbic acid (VITAMIN C) 1000 MG tablet Take 1,000 mg by mouth Daily.     • carBAMazepine XR (TEGretol  XR) 400 MG 12 hr tablet Take 400 mg by mouth 2 (Two) Times a Day.     • clonazePAM (KlonoPIN) 0.5 MG tablet Take 1.5 mg by mouth 2 (Two) Times a Day.     • donepezil (ARICEPT) 10 MG tablet Take 10 mg by mouth Daily.     • levothyroxine (SYNTHROID, LEVOTHROID) 50 MCG tablet Take 50 mcg by mouth Daily.     • Multiple Vitamins-Minerals (MULTIVITAMIN WITH MINERALS) tablet tablet Take 1 tablet by mouth Daily.     • simvastatin (ZOCOR) 40 MG tablet Take 40 mg by mouth Daily.     • vitamin B-12 (CYANOCOBALAMIN) 1000 MCG tablet Take 1,000 mcg by mouth Daily.       No current facility-administered medications for this visit.       Review of Systems   Respiratory: Positive for shortness of breath.      All other systems were reviewed and are negative.  Exceptions are included in the HPI or as otherwise noted above.     Objective   Blood pressure 140/84, pulse 53, temperature 97.8 °F (36.6 °C), temperature source Temporal, height 175.3 cm (69\"), weight 96.3 kg (212 lb 6.4 oz), SpO2 98 %.  Physical Exam    Physical Exam:     Constitutional:    Alert, cooperative, in no acute distress   Head:    Normocephalic, without obvious abnormality, atraumatic   Eyes:            Lids and lashes normal, conjunctivae and sclerae normal, no   icterus, no pallor, corneas clear, PER   ENMT:   Ears appear intact with no abnormalities noted         Neck:  Trachea midline, no JVD       Lungs/Resp:     Normal effort, symmetric chest rise              Heart/CV:   Mildly bradycardic   Abdomen/GI:        :     Deferred   Extremities/MSK:   No clubbing or cyanosis.  No " edema.     Pulses:      Skin:   No bleeding, bruising or rash   Heme/Lymph:      Neurologic:    Psychiatric:       Moves all extremities with no obvious focal motor deficit.  Cranial nerves 2 - 12 grossly intact   Oriented x 3, normal affect.          The above findings are documentation my personal physical examination from today.  Electronically signed by:Justin Issa MD 12/22/21 17:52 EST      PFTs:    6-minute walk test was done on 12/22/2021. Please see scanned simple pulmonary stress test report for details. Initial heart rate was 52 with an oxygen saturation of 99% and a blood pressure of 144/78. The patient walked a total of 6 minutes. He walked a total of 105% predicted distance. Lowest oxygen saturation was 99%. Heart rate at minute 6 was 87 bpm with a blood pressure of 160/72 and a an oxygen saturation of 100%. 1 minute post exercise, his heart rate dropped to 51 bpm with an oxygen saturation 100% and a blood pressure of 134/78.    Full pulmonary function testing was done on 10/27/2021.  Please see PFT report for details.  FVC was 4.75 L or 113 percent predicted.  FEV1 was 3.52 L or 114 percent predicted.  FEV1 to FVC ratio was 74 percent.  Maximal voluntary ventilation 68 L/min or 65 percent predicted.  Total lung capacity 6.95 L or 111 percent predicted.  Residual volume 2.20 L or 88 percent predicted.  DLCO 99 percent predicted.    Interpretation: No obstruction.  No restriction.  No air trapping or hyperinflation.  Low maximal voluntary ventilation which may be effort related.  Normal DLCO.  No prior study available for immediate comparison.    Imaging:  Chest x-ray PA and lateral    Study date: 10/27/2021    Indication: Dyspnea    Comparison: Prior chest x-ray from March 11, 2020    Interpretation: Trachea is midline.  Main giovany is slightly splayed.  Borderline cardiomegaly.  No pleural effusion or pneumothorax.  There are moderate degenerative changes to the thoracic spine.  There is a  left-sided vagal nerve stimulator and a right-sided permanent pacemaker in place.  No significant change from prior study.    Impression: No acute radiographic chest abnormality.    I also reviewed the patient's CT angiogram of the chest from December 5, 2013.  There is no evidence of pulmonary embolism and no significant parenchymal abnormality.  There is a right apical calcified granuloma.    Assessment/Plan   Problems Addressed this Visit        Cardiac and Vasculature    Dyspnea on exertion - Primary    Relevant Orders    6 Minute Walk Test (Completed)    Essential hypertension       ENT    Hoarseness       Neuro    Seizure disorder (HCC)      Diagnoses       Codes Comments    Dyspnea on exertion    -  Primary ICD-10-CM: R06.00  ICD-9-CM: 786.09     Hoarseness     ICD-10-CM: R49.0  ICD-9-CM: 784.42     Seizure disorder (HCC)     ICD-10-CM: G40.909  ICD-9-CM: 345.90     Essential hypertension     ICD-10-CM: I10  ICD-9-CM: 401.9           Discussion:  71 y.o. very pleasant male lifetime non-smoker with history of vagal nerve stimulator for seizure disorder, hypertension, permanent pacemaker placement, is here for evaluation of exertional dyspnea.    Pulmonary function testing is within normal limits, actually on the high side of normal.  He has normal diffusion capacity.  Previously had a CT scan of the chest when he was seen in 2013 with similar complaints that showed no significant parenchymal abnormality.  At that time he also had a normal fluoroscopic sniff test with no evidence of diaphragm dysfunction.  Echocardiogram and nuclear stress test were also within normal limits around that time.    The patient does have some ongoing hoarseness.  He describes some tightness in his throat associated with his episodes of shortness of breath most often with strenuous activity such as climbing stairs. I referred him to ENT (Dr. Casas) and he has paralysis of his left vocal cord and apparent weakness of his right  vocal cord as well. He has subsequently been referred to a laryngologist at [facility] for further evaluation.     Despite his apparent bilateral vocal cord issues, he shows no evidence of fixed or variable obstruction on his pulmonary function testing flow volume loop. He also performs a 6-minute walk test at above predicted distance with no evidence of oxygen desaturation or significant dyspnea.    I am concerned there could be more than one issue at play. Perhaps his vocal cord issues become more significant when he develops a normal level of dyspnea and increased airflow with significant activity. I also wonder if he has some degree of vocal cord dysfunction causing worsening of this issue. Furthermore, he could be developing some sort of shunt only at maximal exercise or have some underlying cardiac issue such as ischemia and I have referred him for consideration of stress echocardiogram with bubble study. He follows from a cardiac standpoint with Dr. Kent.    It appears the referral has been made to Dr. Kent although the patient has not been contacted about an appointment. I have had the office staff recontact/resend the referral.    Depending on further evaluation, could consider referral to speech therapy for evaluation and treatment of potential vocal cord dysfunction.    As I discussed before, if his cardiac evaluation ends up being okay, I would recommend regular moderate aerobic exercise of 25 to 30 minutes at least 4 times per week.         I personally spent a total of 64 minutes on patient visit today including record review, review of images and testing with the patient, face to face with the patient in counseling and discussion and/or coordination of care, discussion of plan for further evaluation, discussion of follow-up plan, and documentation.     Electronically signed by:  Justin Issa MD  12/22/21  17:52 EST    • Please note that portions of this note were completed with  [employer] - a voice recognition program.

## 2022-02-07 ENCOUNTER — TELEPHONE (OUTPATIENT)
Dept: NEUROLOGY | Facility: CLINIC | Age: 72
End: 2022-02-07

## 2022-02-07 NOTE — TELEPHONE ENCOUNTER
[facility]     Pharmacy representative name: [facility]    Pharmacy representative phone number: [phone]    What medication are you calling in regards to: PAIN CREAM DR. PLATA PRESCRIBED 05/27/21    What question does the pharmacy have: PHARMACY IS CALLING TO GET THE PAIN CREAM REFILLED.  STATES THAT DR. PLATA ORDERED IT 05/27/21.  CANNOT FIND IN CHART TO REFILL, BUT WRITTEN RX IS IN .    Who is the provider that prescribed the medication: DR. PLATA    Additional notes: SHE STATES PT IS OUT, LAST REFILLED IN OCT.

## 2022-02-07 NOTE — TELEPHONE ENCOUNTER
Spoke with mariia lowery [facility] and since it is controlled I had to do a verbal order.  Medication is called in.

## 2022-05-27 ENCOUNTER — OFFICE VISIT (OUTPATIENT)
Dept: NEUROLOGY | Facility: CLINIC | Age: 72
End: 2022-05-27

## 2022-05-27 VITALS
BODY MASS INDEX: 30.07 KG/M2 | SYSTOLIC BLOOD PRESSURE: 114 MMHG | HEIGHT: 69 IN | WEIGHT: 203 LBS | OXYGEN SATURATION: 98 % | DIASTOLIC BLOOD PRESSURE: 60 MMHG | HEART RATE: 59 BPM | RESPIRATION RATE: 16 BRPM

## 2022-05-27 DIAGNOSIS — G60.9 IDIOPATHIC PERIPHERAL NEUROPATHY: Primary | ICD-10-CM

## 2022-05-27 PROCEDURE — 99214 OFFICE O/P EST MOD 30 MIN: CPT | Performed by: PSYCHIATRY & NEUROLOGY

## 2022-05-27 NOTE — PROGRESS NOTES
Subjective:    CC: Harsha Delaney is in clinic today for follow up for history of idiopathic peripheral neuropathy.    HPI:  Initial visit: 7/9/2020 : patient is a 69-year-old male with past medical history of generalized epilepsy status post VNS placement in 2007-well-controlled on carbamazepine 400 mg twice daily, hypothyroidism referred to the clinic for evaluation of peripheral neuropathy. He reports that he started having symptoms back in March 2019 where he noticed tingling and numbness involving the fingertips of both his hands. This slowly progressed to involve both his feet. Since then, he reports that the symptoms in his hands have remained stable but it has become significantly worse in both his feet. He reports predominantly numbness but also intermittent tingling, pain involving both his feet. He denies any problems with balance or weakness. He reports that likely at night, it does not affect his sleep. Symptoms are usually worse when he is walking or putting pressure on his feet. He has been tested for diabetes and as per him there is no evidence of diabetes. He was given a trial of low-dose gabapentin 100 to 200 mg but it did not help with the symptoms. He reports that in the past, many years ago he tried Lyrica and it caused him to gain weight. He does not remember why he was started on Lyrica. He does take carbamazepine 400 mg twice daily for epilepsy and he has not had seizures. He also has had EMG/nerve conduction study back in June 2019 which showed mild peripheral neuropathy and mild carpal cell syndrome bilaterally.     9/1/2020: He is in clinic for regular follow-up.  Since his last visit, he has now completed detailed neuropathy work-up just was normal.  He has been taking Lyrica as per my instruction currently on 75 mg nightly dose.  He reports that it has not helped with the symptoms.  At night, he gets severe numbness involving both his feet and he reports that his both feet feel extremely  cold.  He reports gaining 2 pounds since starting the medication.  He denies any other side effects.    2/25/2021: He is in clinic for regular follow-up.  Since his last visit in September, he reports that he did try Lyrica 100 mg nightly but it caused him to have significant weight gain and hence he stopped taking it about a week ago.  He reports that it did not help with neuropathy symptoms anyways.  In the past, he has tried gabapentin for a short period of time at very low-dose 100 to 200 mg daily which did not help him either.    5/27/2021: He is in clinic for regular follow-up.  Since his last visit in February, he did try Topamax as per my instruction but it did not help and it was making him too sleepy so he ultimately stopped taking the medication.  He reports that he continues to have predominant numbness involving the top and the bottom of both his feet extending up to the ankle.  He reports that it does bother him but not to the point where he cannot do ADLs.  He has now tried and failed Lyrica, gabapentin and Topamax.  He is on Tegretol for seizure prevention and doing well without any side effects.  Seizures remain under good control.    5/27/2022: He is in clinic for regular follow-up.  Since his last visit in May 2021, he reports that with the use of compounded cream, symptoms have reduced by 50 to 60%.  He reports that he is very happy with cream as it does not cause any systemic side effects.  He continues to take Tegretol 400 mg twice daily and remains seizure-free.        The following portions of the patient's history were reviewed and updated as of 05/27/2022: allergies, social history and problem list.       Current Outpatient Medications:   •  ascorbic acid (VITAMIN C) 1000 MG tablet, Take 1,000 mg by mouth Daily., Disp: , Rfl:   •  carBAMazepine XR (TEGretol  XR) 400 MG 12 hr tablet, Take 400 mg by mouth 2 (Two) Times a Day., Disp: , Rfl:   •  clonazePAM (KlonoPIN) 0.5 MG tablet, Take 1.5 mg  by mouth 2 (Two) Times a Day., Disp: , Rfl:   •  Cyanocobalamin 1000 MCG capsule, cyanocobalamin (vitamin B-12) 1,000 mcg capsule  Take 1 capsule every day by oral route., Disp: , Rfl:   •  donepezil (ARICEPT) 10 MG tablet, Take 10 mg by mouth Daily., Disp: , Rfl:   •  levothyroxine (SYNTHROID, LEVOTHROID) 50 MCG tablet, Take 50 mcg by mouth Daily., Disp: , Rfl:   •  Multiple Vitamins-Minerals (MULTIVITAMIN WITH MINERALS) tablet tablet, Take 1 tablet by mouth Daily., Disp: , Rfl:   •  simvastatin (ZOCOR) 40 MG tablet, Take 40 mg by mouth Daily., Disp: , Rfl:   •  vitamin B-12 (CYANOCOBALAMIN) 1000 MCG tablet, Take 1,000 mcg by mouth Daily., Disp: , Rfl:    Past Medical History:   Diagnosis Date   • High cholesterol    • History of bradycardia    • Hypertension    • Peripheral neuropathy    • Seizures (HCC)    • Shingles    • SOB (shortness of breath) on exertion       Past Surgical History:   Procedure Laterality Date   • BRAIN SURGERY  03/09/2007    VNS Implantation (Dr. Perez)    • CARPAL TUNNEL RELEASE  11/09/2009    Left (Dr. Torres)   • CARPAL TUNNEL RELEASE  12/02/2009    Right (Dr. Torres)   • CATARACT EXTRACTION  10/16/2013    Epifanio   • CATARACT EXTRACTION     • CERVICAL DISCECTOMY ANTERIOR  12/1989    Dr. Chavira   • COLONOSCOPY     • CORONARY ANGIOPLASTY  10/23/2013    Dr. Kent   • CRANIOTOMY  05/07/2002    Right temporal lobectomy (Dr. Perez)    • CRANIOTOMY  12/21/2010    Temporal lobectomy (Dr. Perez)    • ELBOW PROCEDURE     • KNEE CARTILAGE SURGERY      Right   • LUMBAR DISCECTOMY  09/12/2008    Microendoscopic far lateral dickectomy Left L3-4 (Octaviano)   • PACEMAKER IMPLANTATION  07/23/2013    Dr. Kent   • POSTERIOR LUMBAR/THORACIC SPINE FUSION  10/24/2008    TLIF Left w percutaneous pedicle screw fixation L3/4 (Bean)   • VAGUS NERVE STIMULATOR GENERATOR CHANGE  06/19/2012    (Dr. Perez)    • VAGUS NERVE STIMULATOR IMPLANTATION     • VAGUS NERVE STIMULATOR IMPLANTATION Left  "2/16/2017    Procedure: VAGUS NERVE STIMULATOR IMPLANTATION;  Surgeon: Christiano Lopez MD;  Location: [facility] include [facility] beds at the [facility] OR;  Service:    • VAGUS NERVE STIMULATOR IMPLANTATION        History reviewed. No pertinent family history.     Review of Systems  Objective:    /60   Pulse 59   Resp 16   Ht 175.3 cm (69\")   Wt 92.1 kg (203 lb)   SpO2 98%   BMI 29.98 kg/m²     Neurology Exam:  General apperance: NAD.     Mental status: Alert, awake and oriented to time place and person.    Recent and Remote memory: Can recall 3/3 objects at 5 minutes. Can recall historical events.     Attention span and Concentration: Serial 7s: Normal.     Fund of knowledge:  Normal.     Language and Speech: No aphasia or dysarthria.    Naming , Repitition and Comprehension:  Can name objects, repeat a sentence and follow commands. Speech is clear and fluent with good repetition, comprehension, and naming.    CN II to XII: Intact.    Opthalmoscopic Exam: No papilledema.    Motor:  Right UE muscle strength 5/5. Normal tone.     Left UE muscle strength 5/5. Normal tone.      Right LE muscle strength5/5. Normal tone.     Left LE muscle strength 5/5. Normal tone.      Sensory: Normal light touch, vibration and pinprick sensation bilaterally.    DTRs: 2+ bilaterally.    Babinski: Negative bilaterally.    Co-ordination: Normal finger-to-nose, heel to shin B/L.    Rhomberg: Negative.    Gait: Normal.    Cardiovascular: Regular rate and rhythm without murmur, gallop or rub.    Assessment and Plan:  1. Idiopathic peripheral neuropathy  -He reports that with the use of compounding cream, neuropathy symptoms are about 50 to 60% better controlled and he does not have any systemic side effects.  He has tried and failed gabapentin, Lyrica and Topamax due to side effects.  He remained seizure-free on Tegretol 400 mg twice daily.  Continue with compounded cream as needed throughout the day to help with neuropathy symptoms and I will see him back in 1 year for " follow-up.    I spent 30 minutes in patient care: Reviewing records prior to the visit, entering orders and documentation and spent more than koroma 50% of this time face-to-face in management, instructions and education regarding above mentioned diagnosis and also on counseling and discussing about taking medication regularly, possible side effects with medication use, importance of good sleep hygiene, good hydration and regular exercise.    Return in about 1 year (around 5/27/2023).

## 2023-03-23 ENCOUNTER — TELEPHONE (OUTPATIENT)
Dept: NEUROLOGY | Facility: CLINIC | Age: 73
End: 2023-03-23
Payer: MEDICARE

## 2023-03-23 NOTE — TELEPHONE ENCOUNTER
Caller: [facility] - [address] - [phone]  - [phone]    Relationship: Pharmacy    Best call back number: [phone]    Requested Prescriptions:   Requested Prescriptions      No prescriptions requested or ordered in this encounter        Pharmacy where request should be sent: [facility] - [place], [place] - [address] - [phone]  - [phone]     Last office visit with prescribing clinician: 5/27/2022   Last telemedicine visit with prescribing clinician: 5/26/2023   Next office visit with prescribing clinician: 5/26/2023     Additional details provided by patient: CALLING FOR A REFILL FOR A FOOT  CREAM FOR NEUROPATHY.  STATED PT IS COMPLETELY OUT.    Does the patient have less than a 3 day supply:  [x] Yes  [] No        Brandon Burrows Rep   03/23/23 10:17 EDT

## 2023-05-26 ENCOUNTER — OFFICE VISIT (OUTPATIENT)
Dept: NEUROLOGY | Facility: CLINIC | Age: 73
End: 2023-05-26
Payer: MEDICARE

## 2023-05-26 VITALS
WEIGHT: 203.04 LBS | DIASTOLIC BLOOD PRESSURE: 78 MMHG | SYSTOLIC BLOOD PRESSURE: 138 MMHG | BODY MASS INDEX: 30.07 KG/M2 | HEIGHT: 69 IN

## 2023-05-26 DIAGNOSIS — G40.409 OTHER GENERALIZED EPILEPSY, NOT INTRACTABLE, WITHOUT STATUS EPILEPTICUS: ICD-10-CM

## 2023-05-26 DIAGNOSIS — G60.9 IDIOPATHIC PERIPHERAL NEUROPATHY: Primary | ICD-10-CM

## 2023-05-26 PROCEDURE — 3075F SYST BP GE 130 - 139MM HG: CPT | Performed by: PSYCHIATRY & NEUROLOGY

## 2023-05-26 PROCEDURE — 99214 OFFICE O/P EST MOD 30 MIN: CPT | Performed by: PSYCHIATRY & NEUROLOGY

## 2023-05-26 PROCEDURE — 1160F RVW MEDS BY RX/DR IN RCRD: CPT | Performed by: PSYCHIATRY & NEUROLOGY

## 2023-05-26 PROCEDURE — 3078F DIAST BP <80 MM HG: CPT | Performed by: PSYCHIATRY & NEUROLOGY

## 2023-05-26 PROCEDURE — 1159F MED LIST DOCD IN RCRD: CPT | Performed by: PSYCHIATRY & NEUROLOGY

## 2023-05-26 RX ORDER — CITALOPRAM 10 MG/1
TABLET ORAL
COMMUNITY
Start: 2023-04-08

## 2023-05-26 RX ORDER — POLYETHYLENE GLYCOL-3350 AND ELECTROLYTES 236; 6.74; 5.86; 2.97; 22.74 G/274.31G; G/274.31G; G/274.31G; G/274.31G; G/274.31G
POWDER, FOR SOLUTION ORAL
COMMUNITY
Start: 2023-02-16

## 2023-05-26 RX ORDER — GABAPENTIN 300 MG/1
300 CAPSULE ORAL
Qty: 30 CAPSULE | Refills: 3 | Status: SHIPPED | OUTPATIENT
Start: 2023-05-26 | End: 2023-06-25

## 2023-05-26 RX ORDER — FOLIC ACID 20 MG
CAPSULE ORAL DAILY
COMMUNITY

## 2023-05-26 RX ORDER — ALBUTEROL SULFATE 90 UG/1
2 AEROSOL, METERED RESPIRATORY (INHALATION) EVERY 6 HOURS PRN
COMMUNITY
Start: 2022-09-01 | End: 2023-05-26

## 2023-05-26 NOTE — PROGRESS NOTES
Subjective:    CC: Harsha Delaney is in clinic today for follow up for history of idiopathic peripheral neuropathy and history of generalized epilepsy    HPI:  nitial visit: 7/9/2020 : patient is a 69-year-old male with past medical history of generalized epilepsy status post VNS placement in 2007-well-controlled on carbamazepine 400 mg twice daily, hypothyroidism referred to the clinic for evaluation of peripheral neuropathy. He reports that he started having symptoms back in March 2019 where he noticed tingling and numbness involving the fingertips of both his hands. This slowly progressed to involve both his feet. Since then, he reports that the symptoms in his hands have remained stable but it has become significantly worse in both his feet. He reports predominantly numbness but also intermittent tingling, pain involving both his feet. He denies any problems with balance or weakness. He reports that likely at night, it does not affect his sleep. Symptoms are usually worse when he is walking or putting pressure on his feet. He has been tested for diabetes and as per him there is no evidence of diabetes. He was given a trial of low-dose gabapentin 100 to 200 mg but it did not help with the symptoms. He reports that in the past, many years ago he tried Lyrica and it caused him to gain weight. He does not remember why he was started on Lyrica. He does take carbamazepine 400 mg twice daily for epilepsy and he has not had seizures. He also has had EMG/nerve conduction study back in June 2019 which showed mild peripheral neuropathy and mild carpal cell syndrome bilaterally.     9/1/2020: He is in clinic for regular follow-up.  Since his last visit, he has now completed detailed neuropathy work-up just was normal.  He has been taking Lyrica as per my instruction currently on 75 mg nightly dose.  He reports that it has not helped with the symptoms.  At night, he gets severe numbness involving both his feet and he reports  that his both feet feel extremely cold.  He reports gaining 2 pounds since starting the medication.  He denies any other side effects.    2/25/2021: He is in clinic for regular follow-up.  Since his last visit in September, he reports that he did try Lyrica 100 mg nightly but it caused him to have significant weight gain and hence he stopped taking it about a week ago.  He reports that it did not help with neuropathy symptoms anyways.  In the past, he has tried gabapentin for a short period of time at very low-dose 100 to 200 mg daily which did not help him either.    5/27/2021: He is in clinic for regular follow-up.  Since his last visit in February, he did try Topamax as per my instruction but it did not help and it was making him too sleepy so he ultimately stopped taking the medication.  He reports that he continues to have predominant numbness involving the top and the bottom of both his feet extending up to the ankle.  He reports that it does bother him but not to the point where he cannot do ADLs.  He has now tried and failed Lyrica, gabapentin and Topamax.  He is on Tegretol for seizure prevention and doing well without any side effects.  Seizures remain under good control.    5/27/2022: He is in clinic for regular follow-up.  Since his last visit in May 2021, he reports that with the use of compounded cream, symptoms have reduced by 50 to 60%.  He reports that he is very happy with cream as it does not cause any systemic side effects.  He continues to take Tegretol 400 mg twice daily and remains seizure-free.    5/26/2023: He is in clinic for regular follow-up.  Since his last visit in May 2022, he reports that symptoms of neuropathy are about 50% controlled with use of a compounded cream.  He reports that couple of hours after application of cream, he does feel that the symptoms of neuropathy are returning.  In the past, he had tried gabapentin 200 mg but it was not very beneficial.  Lyrica caused him to  gain weight.  He did not have side effects with gabapentin use.    The following portions of the patient's history were reviewed and updated as of 05/26/2023: allergies, social history and problem list.       Current Outpatient Medications:   •  ascorbic acid (VITAMIN C) 1000 MG tablet, Take 1 tablet by mouth Daily., Disp: , Rfl:   •  carBAMazepine XR (TEGretol  XR) 400 MG 12 hr tablet, Take 1 tablet by mouth 2 (Two) Times a Day., Disp: , Rfl:   •  citalopram (CeleXA) 10 MG tablet, , Disp: , Rfl:   •  clonazePAM (KlonoPIN) 0.5 MG tablet, Take 3 tablets by mouth 2 (Two) Times a Day., Disp: , Rfl:   •  Cyanocobalamin 1000 MCG capsule, cyanocobalamin (vitamin B-12) 1,000 mcg capsule  Take 1 capsule every day by oral route., Disp: , Rfl:   •  donepezil (ARICEPT) 10 MG tablet, Take 1 tablet by mouth Daily., Disp: , Rfl:   •  Folic Acid 20 MG capsule, Take  by mouth Daily., Disp: , Rfl:   •  GaviLyte-G 236 g solution, , Disp: , Rfl:   •  Iron Combinations (Iron Complex) capsule, Take 1 capsule by mouth Daily., Disp: , Rfl:   •  levothyroxine (SYNTHROID, LEVOTHROID) 50 MCG tablet, Take 1 tablet by mouth Daily., Disp: , Rfl:   •  Multiple Vitamins-Minerals (MULTIVITAMIN WITH MINERALS) tablet tablet, Take 1 tablet by mouth Daily., Disp: , Rfl:   •  simvastatin (ZOCOR) 40 MG tablet, Take 1 tablet by mouth Daily., Disp: , Rfl:    Past Medical History:   Diagnosis Date   • High cholesterol    • History of bradycardia    • Hypertension    • Peripheral neuropathy    • Seizures    • Shingles    • SOB (shortness of breath) on exertion       Past Surgical History:   Procedure Laterality Date   • BRAIN SURGERY  03/09/2007    VNS Implantation (Dr. Perez)    • CARPAL TUNNEL RELEASE  11/09/2009    Left (Dr. Torres)   • CARPAL TUNNEL RELEASE  12/02/2009    Right (Dr. Torres)   • CATARACT EXTRACTION  10/16/2013    Epifanio   • CATARACT EXTRACTION     • CERVICAL DISCECTOMY ANTERIOR  12/1989    Dr. Chavira   • COLONOSCOPY     • CORONARY  "ANGIOPLASTY  10/23/2013    Dr. Kent   • CRANIOTOMY  05/07/2002    Right temporal lobectomy (Dr. Perez)    • CRANIOTOMY  12/21/2010    Temporal lobectomy (Dr. Perez)    • ELBOW PROCEDURE     • KNEE CARTILAGE SURGERY      Right   • LUMBAR DISCECTOMY  09/12/2008    Microendoscopic far lateral dickectomy Left L3-4 (Octaviano)   • PACEMAKER IMPLANTATION  07/23/2013    Dr. Kent   • POSTERIOR LUMBAR/THORACIC SPINE FUSION  10/24/2008    TLIF Left w percutaneous pedicle screw fixation L3/4 (Bean)   • VAGUS NERVE STIMULATOR GENERATOR CHANGE  06/19/2012    (Dr. Perez)    • VAGUS NERVE STIMULATOR IMPLANTATION     • VAGUS NERVE STIMULATOR IMPLANTATION Left 2/16/2017    Procedure: VAGUS NERVE STIMULATOR IMPLANTATION;  Surgeon: Christiano Lopez MD;  Location: [facility];  Service:    • VAGUS NERVE STIMULATOR IMPLANTATION        No family history on file.     Review of Systems  Objective:    /78   Ht 175.3 cm (69.02\")   Wt 92.1 kg (203 lb 0.7 oz)   BMI 29.97 kg/m²     Neurology Exam:  General apperance: NAD.     Mental status: Alert, awake and oriented to time place and person.    Recent and Remote memory: Can recall 3/3 objects at 5 minutes. Can recall historical events.     Attention span and Concentration: Serial 7s: Normal.     Fund of knowledge:  Normal.     Language and Speech: No aphasia or dysarthria.    Naming , Repitition and Comprehension:  Can name objects, repeat a sentence and follow commands. Speech is clear and fluent with good repetition, comprehension, and naming.    CN II to XII: Intact.    Opthalmoscopic Exam: No papilledema.    Motor:  Right UE muscle strength 5/5. Normal tone.     Left UE muscle strength 5/5. Normal tone.      Right LE muscle strength5/5. Normal tone.     Left LE muscle strength 5/5. Normal tone.      Sensory: Normal light touch, vibration and pinprick sensation bilaterally.    DTRs: 2+ bilaterally.    Babinski: Negative bilaterally.    Co-ordination: Normal " finger-to-nose, heel to arita B/L.    Rhomberg: Negative.    Gait: Normal.    Cardiovascular: Regular rate and rhythm without murmur, gallop or rub.    Assessment and Plan:  1. Idiopathic peripheral neuropathy  2. Other generalized epilepsy, not intractable, without status epilepticus  -He reports that compounded cream does help but after couple of hours, symptoms of neuropathy return.  In the past, he had tried gabapentin 200 mg which was not beneficial.  Since he does not have any side effects with gabapentin use, I am going to reintroduce gabapentin 300 mg dose and have advised him to call office with response in 2 weeks.  If no side effects then it can be increased further for better therapeutic effect.  Continue with Tegretol  mg twice daily.  Otherwise, I will see him back in clinic in 6 months for follow-up.    I spent 30 minutes in patient care: Reviewing records prior to the visit, entering orders and documentation and spent more than koroma 50% of this time face-to-face in management, instructions and education regarding above mentioned diagnosis and also on counseling and discussing about taking medication regularly, possible side effects with medication use, importance of good sleep hygiene, good hydration and regular exercise.    Return in about 6 months (around 11/26/2023).       Note to patient: The 21st Century Cures Act makes medical notes like these available to patients in the interest of transparency. However, be advised this is a medical document. It is intended as peer to peer communication. It is written in medical language and may contain abbreviations or verbiage that are unfamiliar. It may appear blunt or direct. Medical documents are intended to carry relevant information, facts as evident, and the clinical opinion of the physician.

## 2023-07-31 ENCOUNTER — TRANSCRIBE ORDERS (OUTPATIENT)
Dept: ADMINISTRATIVE | Facility: HOSPITAL | Age: 73
End: 2023-07-31
Payer: MEDICARE

## 2023-07-31 DIAGNOSIS — R22.0 SWELLING, MASS, OR LUMP ON FACE: Primary | ICD-10-CM

## 2023-08-16 ENCOUNTER — HOSPITAL ENCOUNTER (OUTPATIENT)
Dept: CT IMAGING | Facility: HOSPITAL | Age: 73
Discharge: HOME | End: 2023-08-16
Admitting: FAMILY MEDICINE
Payer: MEDICARE

## 2023-08-16 DIAGNOSIS — R22.0 SWELLING, MASS, OR LUMP ON FACE: ICD-10-CM

## 2023-08-16 PROCEDURE — 70486 CT MAXILLOFACIAL W/O DYE: CPT

## 2023-09-21 DIAGNOSIS — G60.9 IDIOPATHIC PERIPHERAL NEUROPATHY: ICD-10-CM

## 2023-09-21 NOTE — TELEPHONE ENCOUNTER
Caller:MIKE Carcamo call back number: [phone]     Requested Prescriptions: GAGAPENTIN 300 MG   Requested Prescriptions      No prescriptions requested or ordered in this encounter        Pharmacy where request should be sent:      [facility] [id number] [address]  AT [facility] & [address]  - [phone]  - [phone]  [phone]    Last office visit with prescribing clinician: 5/26/2023   Last telemedicine visit with prescribing clinician: Visit date not found   Next office visit with prescribing clinician: 11/27/2023     Additional details provided by patient: PT HAS 2-3 DAYS LEFT     Does the patient have less than a 3 day supply:  [x] Yes  [] No    Would you like a call back once the refill request has been completed: [] Yes [] No    If the office needs to give you a call back, can they leave a voicemail: [] Yes [] No    Brandon Harris Rep   09/21/23 10:55 EDT

## 2023-09-22 RX ORDER — GABAPENTIN 300 MG/1
300 CAPSULE ORAL
Qty: 30 CAPSULE | Refills: 3 | Status: SHIPPED | OUTPATIENT
Start: 2023-09-22 | End: 2023-10-22

## 2024-01-22 DIAGNOSIS — G60.9 IDIOPATHIC PERIPHERAL NEUROPATHY: ICD-10-CM

## 2024-01-22 RX ORDER — GABAPENTIN 300 MG/1
300 CAPSULE ORAL
Qty: 30 CAPSULE | Refills: 5 | Status: SHIPPED | OUTPATIENT
Start: 2024-01-22

## 2024-01-22 NOTE — TELEPHONE ENCOUNTER
Rx Refill Note  Requested Prescriptions     Pending Prescriptions Disp Refills    gabapentin (NEURONTIN) 300 MG capsule [Pharmacy Med Name: GABAPENTIN 300 MG CAPSULE] 30 capsule      Sig: TAKE ONE CAPSULE BY MOUTH EVERY NIGHT AT BEDTIME      Last filled: 9/22/23 for 4 mos total    Last office visit with prescribing clinician: 5/26/2023      Next office visit with prescribing clinician: 4/22/2024     Aakash Herrera MA  01/22/24, 12:46 EST

## 2024-02-26 ENCOUNTER — OFFICE VISIT (OUTPATIENT)
Dept: NEUROLOGY | Facility: CLINIC | Age: 74
End: 2024-02-26
Payer: MEDICARE

## 2024-02-26 VITALS
HEART RATE: 60 BPM | OXYGEN SATURATION: 98 % | BODY MASS INDEX: 30.21 KG/M2 | HEIGHT: 69 IN | WEIGHT: 204 LBS | SYSTOLIC BLOOD PRESSURE: 132 MMHG | DIASTOLIC BLOOD PRESSURE: 56 MMHG

## 2024-02-26 DIAGNOSIS — G60.9 IDIOPATHIC PERIPHERAL NEUROPATHY: Primary | ICD-10-CM

## 2024-02-26 DIAGNOSIS — G40.409 OTHER GENERALIZED EPILEPSY, NOT INTRACTABLE, WITHOUT STATUS EPILEPTICUS: ICD-10-CM

## 2024-02-26 PROCEDURE — 3078F DIAST BP <80 MM HG: CPT | Performed by: PSYCHIATRY & NEUROLOGY

## 2024-02-26 PROCEDURE — 1160F RVW MEDS BY RX/DR IN RCRD: CPT | Performed by: PSYCHIATRY & NEUROLOGY

## 2024-02-26 PROCEDURE — 1159F MED LIST DOCD IN RCRD: CPT | Performed by: PSYCHIATRY & NEUROLOGY

## 2024-02-26 PROCEDURE — 99214 OFFICE O/P EST MOD 30 MIN: CPT | Performed by: PSYCHIATRY & NEUROLOGY

## 2024-02-26 PROCEDURE — 3075F SYST BP GE 130 - 139MM HG: CPT | Performed by: PSYCHIATRY & NEUROLOGY

## 2024-02-26 RX ORDER — GABAPENTIN 300 MG/1
300 CAPSULE ORAL 2 TIMES DAILY
Qty: 60 CAPSULE | Refills: 6 | Status: SHIPPED | OUTPATIENT
Start: 2024-02-26 | End: 2024-03-27

## 2024-02-26 NOTE — PROGRESS NOTES
Subjective:    CC: Harsha Delaney is in clinic today for follow up for      HPI:  kal visit: 7/9/2020 : patient is a 69-year-old male with past medical history of generalized epilepsy status post VNS placement in 2007-well-controlled on carbamazepine 400 mg twice daily, hypothyroidism referred to the clinic for evaluation of peripheral neuropathy. He reports that he started having symptoms back in March 2019 where he noticed tingling and numbness involving the fingertips of both his hands. This slowly progressed to involve both his feet. Since then, he reports that the symptoms in his hands have remained stable but it has become significantly worse in both his feet. He reports predominantly numbness but also intermittent tingling, pain involving both his feet. He denies any problems with balance or weakness. He reports that likely at night, it does not affect his sleep. Symptoms are usually worse when he is walking or putting pressure on his feet. He has been tested for diabetes and as per him there is no evidence of diabetes. He was given a trial of low-dose gabapentin 100 to 200 mg but it did not help with the symptoms. He reports that in the past, many years ago he tried Lyrica and it caused him to gain weight. He does not remember why he was started on Lyrica. He does take carbamazepine 400 mg twice daily for epilepsy and he has not had seizures. He also has had EMG/nerve conduction study back in June 2019 which showed mild peripheral neuropathy and mild carpal cell syndrome bilaterally.     9/1/2020: He is in clinic for regular follow-up.  Since his last visit, he has now completed detailed neuropathy work-up just was normal.  He has been taking Lyrica as per my instruction currently on 75 mg nightly dose.  He reports that it has not helped with the symptoms.  At night, he gets severe numbness involving both his feet and he reports that his both feet feel extremely cold.  He reports gaining 2 pounds since  starting the medication.  He denies any other side effects.    2/25/2021: He is in clinic for regular follow-up.  Since his last visit in September, he reports that he did try Lyrica 100 mg nightly but it caused him to have significant weight gain and hence he stopped taking it about a week ago.  He reports that it did not help with neuropathy symptoms anyways.  In the past, he has tried gabapentin for a short period of time at very low-dose 100 to 200 mg daily which did not help him either.    5/27/2021: He is in clinic for regular follow-up.  Since his last visit in February, he did try Topamax as per my instruction but it did not help and it was making him too sleepy so he ultimately stopped taking the medication.  He reports that he continues to have predominant numbness involving the top and the bottom of both his feet extending up to the ankle.  He reports that it does bother him but not to the point where he cannot do ADLs.  He has now tried and failed Lyrica, gabapentin and Topamax.  He is on Tegretol for seizure prevention and doing well without any side effects.  Seizures remain under good control.    5/27/2022: He is in clinic for regular follow-up.  Since his last visit in May 2021, he reports that with the use of compounded cream, symptoms have reduced by 50 to 60%.  He reports that he is very happy with cream as it does not cause any systemic side effects.  He continues to take Tegretol 400 mg twice daily and remains seizure-free.    5/26/2023: He is in clinic for regular follow-up.  Since his last visit in May 2022, he reports that symptoms of neuropathy are about 50% controlled with use of a compounded cream.  He reports that couple of hours after application of cream, he does feel that the symptoms of neuropathy are returning.  In the past, he had tried gabapentin 200 mg but it was not very beneficial.  Lyrica caused him to gain weight.  He did not have side effects with gabapentin  use.    2/26/2024: He is in clinic for regular follow-up.  Since his last visit in May 2023, he is now taking gabapentin 300 mg at bedtime and reports 20 to 30% reduction in numbness.  He reports that however some days, it is difficult for him to feel the ground and he has to walk very carefully and cautiously to prevent from falling.  He is using compounded cream also which helps some.  He reports extreme coldness involving both his feet as well.  No seizures since his last visit.  He is taking Tegretol 400 mg twice daily.    The following portions of the patient's history were reviewed and updated as of 02/26/2024: allergies, social history, and problem list.       Current Outpatient Medications:     ascorbic acid (VITAMIN C) 1000 MG tablet, Take 1 tablet by mouth Daily., Disp: , Rfl:     carBAMazepine XR (TEGretol  XR) 400 MG 12 hr tablet, Take 1 tablet by mouth 2 (Two) Times a Day., Disp: , Rfl:     citalopram (CeleXA) 10 MG tablet, Take 1 tablet by mouth Daily., Disp: , Rfl:     clonazePAM (KlonoPIN) 0.5 MG tablet, Take 3 tablets by mouth 2 (Two) Times a Day., Disp: , Rfl:     Cyanocobalamin 1000 MCG capsule, cyanocobalamin (vitamin B-12) 1,000 mcg capsule  Take 1 capsule every day by oral route., Disp: , Rfl:     donepezil (ARICEPT) 10 MG tablet, Take 1 tablet by mouth Daily., Disp: , Rfl:     Folic Acid 20 MG capsule, Take  by mouth Daily., Disp: , Rfl:     gabapentin (NEURONTIN) 300 MG capsule, Take 1 capsule by mouth 2 (Two) Times a Day for 30 days., Disp: 60 capsule, Rfl: 6    Iron Combinations (Iron Complex) capsule, Take 1 capsule by mouth Daily., Disp: , Rfl:     levothyroxine (SYNTHROID, LEVOTHROID) 50 MCG tablet, Take 1 tablet by mouth Daily., Disp: , Rfl:     Multiple Vitamins-Minerals (MULTIVITAMIN WITH MINERALS) tablet tablet, Take 1 tablet by mouth Daily., Disp: , Rfl:     simvastatin (ZOCOR) 40 MG tablet, Take 1 tablet by mouth Daily., Disp: , Rfl:    Past Medical History:   Diagnosis Date     "High cholesterol     History of bradycardia     Hypertension     Peripheral neuropathy     Seizures     Shingles     SOB (shortness of breath) on exertion       Past Surgical History:   Procedure Laterality Date    BRAIN SURGERY  03/09/2007    VNS Implantation (Dr. Perez)     CARPAL TUNNEL RELEASE  11/09/2009    Left (Dr. Torres)    CARPAL TUNNEL RELEASE  12/02/2009    Right (Dr. Torres)    CATARACT EXTRACTION  10/16/2013    Godfrey    CATARACT EXTRACTION      CERVICAL DISCECTOMY ANTERIOR  12/1989    Dr. Chavira    COLONOSCOPY      CORONARY ANGIOPLASTY  10/23/2013    Dr. Kent    CRANIOTOMY  05/07/2002    Right temporal lobectomy (Dr. Perez)     CRANIOTOMY  12/21/2010    Temporal lobectomy (Dr. Perez)     ELBOW PROCEDURE      KNEE CARTILAGE SURGERY      Right    LUMBAR DISCECTOMY  09/12/2008    Microendoscopic far lateral dickectomy Left L3-4 ([place])    PACEMAKER IMPLANTATION  07/23/2013    Dr. Kent    POSTERIOR LUMBAR/THORACIC SPINE FUSION  10/24/2008    TLIF Left w percutaneous pedicle screw fixation L3/4 ([place])    VAGUS NERVE STIMULATOR GENERATOR CHANGE  06/19/2012    (Dr. Perez)     VAGUS NERVE STIMULATOR IMPLANTATION      VAGUS NERVE STIMULATOR IMPLANTATION Left 02/16/2017    Procedure: VAGUS NERVE STIMULATOR IMPLANTATION;  Surgeon: Christiano Lopez MD;  Location: [facility];  Service:     VAGUS NERVE STIMULATOR IMPLANTATION        No family history on file.     Review of Systems  Objective:    /56   Pulse 60   Ht 175.3 cm (69.02\")   Wt 92.5 kg (204 lb)   SpO2 98%   BMI 30.11 kg/m²     Neurology Exam:  General apperance: NAD.     Mental status: Alert, awake and oriented to time place and person.    Language and Speech: No aphasia or dysarthria.    CN II to XII: Intact.    Opthalmoscopic Exam: No papilledema.    Motor:  Right UE muscle strength 5/5. Normal tone.     Left UE muscle strength 5/5. Normal tone.      Right LE muscle strength 5/5. Normal tone.     Left LE muscle strength " 5/5. Normal tone.      Sensory: Normal light touch, vibration and pinprick sensation bilaterally.    DTRs: 2+ bilaterally.    Babinski: Negative bilaterally.    Co-ordination: Normal finger-to-nose, heel to shin B/L.    Rhomberg: Negative.    Gait: Normal.    Cardiovascular: Regular rate and rhythm without murmur, gallop or rub.    Assessment and Plan:  1. Idiopathic peripheral neuropathy  2. Other generalized epilepsy, not intractable, without status epilepticus  Symptoms are slightly improved with reintroduction of gabapentin but he still has days where numbness is quite debilitating.  Since he does not have any side effects with gabapentin use, I am going to increase it to 100 mg twice daily dose for better therapeutic effect.  Continue with compounded cream as it is.  Continue with carbamazepine 400 mg twice daily and I will see him back in clinic in 4 months for follow-up.    I spent 30 minutes in patient care: Reviewing records prior to the visit, entering orders and documentation and spent more than koroma 50% of this time face-to-face in management, instructions and education regarding above mentioned diagnosis and also on counseling and discussing about taking medication regularly, possible side effects with medication use, importance of good sleep hygiene, good hydration and regular exercise.    Return in about 4 months (around 6/26/2024).       Note to patient: The 21st Century Cures Act makes medical notes like these available to patients in the interest of transparency. However, be advised this is a medical document. It is intended as peer to peer communication. It is written in medical language and may contain abbreviations or verbiage that are unfamiliar. It may appear blunt or direct. Medical documents are intended to carry relevant information, facts as evident, and the clinical opinion of the physician.

## 2024-05-15 ENCOUNTER — TELEPHONE (OUTPATIENT)
Dept: NEUROLOGY | Facility: CLINIC | Age: 74
End: 2024-05-15
Payer: MEDICARE

## 2024-05-15 NOTE — TELEPHONE ENCOUNTER
"Relay     \"Returned call and LVM on provider line. I would like to double check which med the rx is for with what strengths.     Most recent cream rx I see is from 2021 (in media tab) and not sure if that rx would even be valid to fill from anymore? (Pharm said last fill was Spet 2023). Once I get rx info I will double check with provider.       GRACE Finn \"                  "

## 2024-05-15 NOTE — TELEPHONE ENCOUNTER
IZZY AT [facility] CALLING TO GET A REFILL REQUEST SENT TO THEM FOR A COMPOUNDING CREME FOR PATIENT.    UNABLE TO FIND ON MED LIST OR DISCONTINUED LIST.  SHE STATES PROVIDER HAS WRITTEN FOR PATIENT FOR SOMETIME AND LAST TIME IT WAS FILLED WAS SEPTEMBER 2023.    PLEASE ADVISE PHARMACY    SEND REFILL REQUEST TO:    [facility] - [place], [place] - [facility] [address] - [phone]  - [phone] FX

## 2024-06-27 ENCOUNTER — OFFICE VISIT (OUTPATIENT)
Dept: NEUROLOGY | Facility: CLINIC | Age: 74
End: 2024-06-27
Payer: MEDICARE

## 2024-06-27 VITALS
HEIGHT: 69 IN | OXYGEN SATURATION: 97 % | BODY MASS INDEX: 30.11 KG/M2 | SYSTOLIC BLOOD PRESSURE: 146 MMHG | DIASTOLIC BLOOD PRESSURE: 84 MMHG | HEART RATE: 53 BPM

## 2024-06-27 DIAGNOSIS — G60.9 IDIOPATHIC PERIPHERAL NEUROPATHY: Primary | ICD-10-CM

## 2024-06-27 DIAGNOSIS — G40.409 OTHER GENERALIZED EPILEPSY, NOT INTRACTABLE, WITHOUT STATUS EPILEPTICUS: ICD-10-CM

## 2024-06-27 PROCEDURE — 3079F DIAST BP 80-89 MM HG: CPT | Performed by: PSYCHIATRY & NEUROLOGY

## 2024-06-27 PROCEDURE — 1160F RVW MEDS BY RX/DR IN RCRD: CPT | Performed by: PSYCHIATRY & NEUROLOGY

## 2024-06-27 PROCEDURE — 3077F SYST BP >= 140 MM HG: CPT | Performed by: PSYCHIATRY & NEUROLOGY

## 2024-06-27 PROCEDURE — 1159F MED LIST DOCD IN RCRD: CPT | Performed by: PSYCHIATRY & NEUROLOGY

## 2024-06-27 PROCEDURE — 99214 OFFICE O/P EST MOD 30 MIN: CPT | Performed by: PSYCHIATRY & NEUROLOGY

## 2024-06-27 RX ORDER — GABAPENTIN 300 MG/1
CAPSULE ORAL
Qty: 120 CAPSULE | Refills: 6 | Status: SHIPPED | OUTPATIENT
Start: 2024-06-27

## 2024-06-27 NOTE — PROGRESS NOTES
Subjective:    CC: Harsha Delaney is in clinic today for follow up for idiopathic peripheral neuropathy and seizures.    HPI:  nitisacc visit: 7/9/2020 : patient is a 69-year-old male with past medical history of generalized epilepsy status post VNS placement in 2007-well-controlled on carbamazepine 400 mg twice daily, hypothyroidism referred to the clinic for evaluation of peripheral neuropathy. He reports that he started having symptoms back in March 2019 where he noticed tingling and numbness involving the fingertips of both his hands. This slowly progressed to involve both his feet. Since then, he reports that the symptoms in his hands have remained stable but it has become significantly worse in both his feet. He reports predominantly numbness but also intermittent tingling, pain involving both his feet. He denies any problems with balance or weakness. He reports that likely at night, it does not affect his sleep. Symptoms are usually worse when he is walking or putting pressure on his feet. He has been tested for diabetes and as per him there is no evidence of diabetes. He was given a trial of low-dose gabapentin 100 to 200 mg but it did not help with the symptoms. He reports that in the past, many years ago he tried Lyrica and it caused him to gain weight. He does not remember why he was started on Lyrica. He does take carbamazepine 400 mg twice daily for epilepsy and he has not had seizures. He also has had EMG/nerve conduction study back in June 2019 which showed mild peripheral neuropathy and mild carpal cell syndrome bilaterally.     9/1/2020: He is in clinic for regular follow-up.  Since his last visit, he has now completed detailed neuropathy work-up just was normal.  He has been taking Lyrica as per my instruction currently on 75 mg nightly dose.  He reports that it has not helped with the symptoms.  At night, he gets severe numbness involving both his feet and he reports that his both feet feel extremely  cold.  He reports gaining 2 pounds since starting the medication.  He denies any other side effects.    2/25/2021: He is in clinic for regular follow-up.  Since his last visit in September, he reports that he did try Lyrica 100 mg nightly but it caused him to have significant weight gain and hence he stopped taking it about a week ago.  He reports that it did not help with neuropathy symptoms anyways.  In the past, he has tried gabapentin for a short period of time at very low-dose 100 to 200 mg daily which did not help him either.    5/27/2021: He is in clinic for regular follow-up.  Since his last visit in February, he did try Topamax as per my instruction but it did not help and it was making him too sleepy so he ultimately stopped taking the medication.  He reports that he continues to have predominant numbness involving the top and the bottom of both his feet extending up to the ankle.  He reports that it does bother him but not to the point where he cannot do ADLs.  He has now tried and failed Lyrica, gabapentin and Topamax.  He is on Tegretol for seizure prevention and doing well without any side effects.  Seizures remain under good control.    5/27/2022: He is in clinic for regular follow-up.  Since his last visit in May 2021, he reports that with the use of compounded cream, symptoms have reduced by 50 to 60%.  He reports that he is very happy with cream as it does not cause any systemic side effects.  He continues to take Tegretol 400 mg twice daily and remains seizure-free.    5/26/2023: He is in clinic for regular follow-up.  Since his last visit in May 2022, he reports that symptoms of neuropathy are about 50% controlled with use of a compounded cream.  He reports that couple of hours after application of cream, he does feel that the symptoms of neuropathy are returning.  In the past, he had tried gabapentin 200 mg but it was not very beneficial.  Lyrica caused him to gain weight.  He did not have side  effects with gabapentin use.    2/26/2024: He is in clinic for regular follow-up.  Since his last visit in May 2023, he is now taking gabapentin 300 mg at bedtime and reports 20 to 30% reduction in numbness.  He reports that however some days, it is difficult for him to feel the ground and he has to walk very carefully and cautiously to prevent from falling.  He is using compounded cream also which helps some.  He reports extreme coldness involving both his feet as well.  No seizures since his last visit.  He is taking Tegretol 400 mg twice daily.    6/27/2024: He is in clinic for regular follow-up.  Since his last visit in February 2024, he has been taking gabapentin 300 mg twice daily.  He reports no change in numbness involving both his feet.  Especially in the evening hours and nighttime, it bothers him a lot.  He denies any side effects with gabapentin use.  He is seeing compounded cream to be applied locally and it does help with the symptoms.  No seizures since his last visit.  Tolerating Tegretol 400 mg twice daily well.    The following portions of the patient's history were reviewed and updated as of 06/27/2024: allergies, social history, and problem list.       Current Outpatient Medications:     ascorbic acid (VITAMIN C) 1000 MG tablet, Take 1 tablet by mouth Daily., Disp: , Rfl:     carBAMazepine XR (TEGretol  XR) 400 MG 12 hr tablet, Take 1 tablet by mouth 2 (Two) Times a Day., Disp: , Rfl:     citalopram (CeleXA) 10 MG tablet, Take 1 tablet by mouth Daily., Disp: , Rfl:     clonazePAM (KlonoPIN) 0.5 MG tablet, Take 3 tablets by mouth 2 (Two) Times a Day., Disp: , Rfl:     Cyanocobalamin 1000 MCG capsule, cyanocobalamin (vitamin B-12) 1,000 mcg capsule  Take 1 capsule every day by oral route., Disp: , Rfl:     donepezil (ARICEPT) 10 MG tablet, Take 1 tablet by mouth Daily., Disp: , Rfl:     Folic Acid 20 MG capsule, Take  by mouth Daily., Disp: , Rfl:     gabapentin (NEURONTIN) 300 MG capsule, Take 1  capsule in the morning, 1 capsule at noon and 2 capsules at night, Disp: 120 capsule, Rfl: 6    Iron Combinations (Iron Complex) capsule, Take 1 capsule by mouth Daily., Disp: , Rfl:     levothyroxine (SYNTHROID, LEVOTHROID) 50 MCG tablet, Take 1 tablet by mouth Daily., Disp: , Rfl:     Multiple Vitamins-Minerals (MULTIVITAMIN WITH MINERALS) tablet tablet, Take 1 tablet by mouth Daily., Disp: , Rfl:     simvastatin (ZOCOR) 40 MG tablet, Take 1 tablet by mouth Daily., Disp: , Rfl:    Past Medical History:   Diagnosis Date    High cholesterol     History of bradycardia     Hypertension     Peripheral neuropathy     Seizures     Shingles     SOB (shortness of breath) on exertion       Past Surgical History:   Procedure Laterality Date    BRAIN SURGERY  03/09/2007    VNS Implantation (Dr. Perez)     CARPAL TUNNEL RELEASE  11/09/2009    Left (Dr. Torres)    CARPAL TUNNEL RELEASE  12/02/2009    Right (Dr. Torres)    CATARACT EXTRACTION  10/16/2013    Godfrey    CATARACT EXTRACTION      CERVICAL DISCECTOMY ANTERIOR  12/1989    Dr. Chavira    COLONOSCOPY      CORONARY ANGIOPLASTY  10/23/2013    Dr. Kent    CRANIOTOMY  05/07/2002    Right temporal lobectomy (Dr. Perez)     CRANIOTOMY  12/21/2010    Temporal lobectomy (Dr. Perez)     ELBOW PROCEDURE      KNEE CARTILAGE SURGERY      Right    LUMBAR DISCECTOMY  09/12/2008    Microendoscopic far lateral dickectomy Left L3-4 ([place])    PACEMAKER IMPLANTATION  07/23/2013    Dr. Kent    POSTERIOR LUMBAR/THORACIC SPINE FUSION  10/24/2008    TLIF Left w percutaneous pedicle screw fixation L3/4 ([place])    VAGUS NERVE STIMULATOR GENERATOR CHANGE  06/19/2012    (Dr. Perez) [place]    VAGUS NERVE STIMULATOR IMPLANTATION      VAGUS NERVE STIMULATOR IMPLANTATION Left 02/16/2017    Procedure: VAGUS NERVE STIMULATOR IMPLANTATION;  Surgeon: Christiano Lopez MD;  Location: [facility] OR;  Service:     VAGUS NERVE STIMULATOR IMPLANTATION        No family history on file.     Review of  "Systems  Objective:    /84   Pulse 53   Ht 175.3 cm (69.02\")   SpO2 97%   BMI 30.11 kg/m²     Neurology Exam:  General apperance: NAD.     Mental status: Alert, awake and oriented to time place and person.    Language and Speech: No aphasia or dysarthria.    CN II to XII: Intact.    Opthalmoscopic Exam: No papilledema.    Motor:  Right UE muscle strength 5/5. Normal tone.     Left UE muscle strength 5/5. Normal tone.      Right LE muscle strength 5/5. Normal tone.     Left LE muscle strength 5/5. Normal tone.      Sensory: Normal light touch, vibration and pinprick sensation bilaterally.    DTRs: 2+ bilaterally.    Babinski: Negative bilaterally.    Co-ordination: Normal finger-to-nose, heel to shin B/L.    Rhomberg: Negative.    Gait: Normal.    Cardiovascular: Regular rate and rhythm without murmur, gallop or rub.    Assessment and Plan:  1. Idiopathic peripheral neuropathy  2. Other generalized epilepsy, not intractable, without status epilepticus  He continues to have predominantly numbness involving the toes of both his feet and that bothers him a lot especially in the evening and nighttime.  Since he does not have any side effects with gabapentin use, I will advise him to gabapentin to 300 mg 3 times a day for 2 weeks then will increase the nighttime dose to 600 mg and will continue with 300 mg in the morning and at noon.  Continue Tegretol 400 mg twice daily.  He has remained seizure-free.  Continue with compounded cream as needed for neuropathy since it is helping.  Otherwise I will see him back in clinic in 4 months for follow-up.    I spent 30 minutes in patient care: Reviewing records prior to the visit, entering orders and documentation and spent more than koroma 50% of this time face-to-face in management, instructions and education regarding above mentioned diagnosis and also on counseling and discussing about taking medication regularly, possible side effects with medication use, importance of " good sleep hygiene, good hydration and regular exercise.    Return in about 4 months (around 10/27/2024).       Note to patient: The 21st Century Cures Act makes medical notes like these available to patients in the interest of transparency. However, be advised this is a medical document. It is intended as peer to peer communication. It is written in medical language and may contain abbreviations or verbiage that are unfamiliar. It may appear blunt or direct. Medical documents are intended to carry relevant information, facts as evident, and the clinical opinion of the physician.

## 2024-09-05 ENCOUNTER — TELEPHONE (OUTPATIENT)
Dept: NEUROLOGY | Facility: CLINIC | Age: 74
End: 2024-09-05
Payer: MEDICARE

## 2024-10-31 ENCOUNTER — OFFICE VISIT (OUTPATIENT)
Dept: NEUROLOGY | Facility: CLINIC | Age: 74
End: 2024-10-31
Payer: MEDICARE

## 2024-10-31 VITALS
HEIGHT: 69 IN | BODY MASS INDEX: 30.11 KG/M2 | SYSTOLIC BLOOD PRESSURE: 154 MMHG | DIASTOLIC BLOOD PRESSURE: 82 MMHG | OXYGEN SATURATION: 97 % | HEART RATE: 52 BPM

## 2024-10-31 DIAGNOSIS — G60.9 IDIOPATHIC PERIPHERAL NEUROPATHY: Primary | ICD-10-CM

## 2024-10-31 DIAGNOSIS — G40.409 OTHER GENERALIZED EPILEPSY, NOT INTRACTABLE, WITHOUT STATUS EPILEPTICUS: ICD-10-CM

## 2024-10-31 PROCEDURE — 3077F SYST BP >= 140 MM HG: CPT | Performed by: PSYCHIATRY & NEUROLOGY

## 2024-10-31 PROCEDURE — 99214 OFFICE O/P EST MOD 30 MIN: CPT | Performed by: PSYCHIATRY & NEUROLOGY

## 2024-10-31 PROCEDURE — 1160F RVW MEDS BY RX/DR IN RCRD: CPT | Performed by: PSYCHIATRY & NEUROLOGY

## 2024-10-31 PROCEDURE — 1159F MED LIST DOCD IN RCRD: CPT | Performed by: PSYCHIATRY & NEUROLOGY

## 2024-10-31 PROCEDURE — 3079F DIAST BP 80-89 MM HG: CPT | Performed by: PSYCHIATRY & NEUROLOGY

## 2024-10-31 RX ORDER — CARBAMAZEPINE 400 MG/1
400 TABLET, EXTENDED RELEASE ORAL 2 TIMES DAILY
Qty: 180 TABLET | Refills: 1 | Status: SHIPPED | OUTPATIENT
Start: 2024-10-31 | End: 2025-01-29

## 2024-10-31 RX ORDER — GABAPENTIN 400 MG/1
CAPSULE ORAL
Qty: 60 CAPSULE | Refills: 6 | Status: SHIPPED | OUTPATIENT
Start: 2024-10-31

## 2024-10-31 RX ORDER — GABAPENTIN 300 MG/1
CAPSULE ORAL
Qty: 60 CAPSULE | Refills: 6 | Status: SHIPPED | OUTPATIENT
Start: 2024-10-31

## 2024-10-31 NOTE — PROGRESS NOTES
Subjective:    CC: Harsha Delaney is in clinic today for follow up for      HPI:  kal visit: 7/9/2020 : patient is a 69-year-old male with past medical history of generalized epilepsy status post VNS placement in 2007-well-controlled on carbamazepine 400 mg twice daily, hypothyroidism referred to the clinic for evaluation of peripheral neuropathy. He reports that he started having symptoms back in March 2019 where he noticed tingling and numbness involving the fingertips of both his hands. This slowly progressed to involve both his feet. Since then, he reports that the symptoms in his hands have remained stable but it has become significantly worse in both his feet. He reports predominantly numbness but also intermittent tingling, pain involving both his feet. He denies any problems with balance or weakness. He reports that likely at night, it does not affect his sleep. Symptoms are usually worse when he is walking or putting pressure on his feet. He has been tested for diabetes and as per him there is no evidence of diabetes. He was given a trial of low-dose gabapentin 100 to 200 mg but it did not help with the symptoms. He reports that in the past, many years ago he tried Lyrica and it caused him to gain weight. He does not remember why he was started on Lyrica. He does take carbamazepine 400 mg twice daily for epilepsy and he has not had seizures. He also has had EMG/nerve conduction study back in June 2019 which showed mild peripheral neuropathy and mild carpal cell syndrome bilaterally.     9/1/2020: He is in clinic for regular follow-up.  Since his last visit, he has now completed detailed neuropathy work-up just was normal.  He has been taking Lyrica as per my instruction currently on 75 mg nightly dose.  He reports that it has not helped with the symptoms.  At night, he gets severe numbness involving both his feet and he reports that his both feet feel extremely cold.  He reports gaining 2 pounds since  starting the medication.  He denies any other side effects.    2/25/2021: He is in clinic for regular follow-up.  Since his last visit in September, he reports that he did try Lyrica 100 mg nightly but it caused him to have significant weight gain and hence he stopped taking it about a week ago.  He reports that it did not help with neuropathy symptoms anyways.  In the past, he has tried gabapentin for a short period of time at very low-dose 100 to 200 mg daily which did not help him either.    5/27/2021: He is in clinic for regular follow-up.  Since his last visit in February, he did try Topamax as per my instruction but it did not help and it was making him too sleepy so he ultimately stopped taking the medication.  He reports that he continues to have predominant numbness involving the top and the bottom of both his feet extending up to the ankle.  He reports that it does bother him but not to the point where he cannot do ADLs.  He has now tried and failed Lyrica, gabapentin and Topamax.  He is on Tegretol for seizure prevention and doing well without any side effects.  Seizures remain under good control.    5/27/2022: He is in clinic for regular follow-up.  Since his last visit in May 2021, he reports that with the use of compounded cream, symptoms have reduced by 50 to 60%.  He reports that he is very happy with cream as it does not cause any systemic side effects.  He continues to take Tegretol 400 mg twice daily and remains seizure-free.    5/26/2023: He is in clinic for regular follow-up.  Since his last visit in May 2022, he reports that symptoms of neuropathy are about 50% controlled with use of a compounded cream.  He reports that couple of hours after application of cream, he does feel that the symptoms of neuropathy are returning.  In the past, he had tried gabapentin 200 mg but it was not very beneficial.  Lyrica caused him to gain weight.  He did not have side effects with gabapentin  use.    2/26/2024: He is in clinic for regular follow-up.  Since his last visit in May 2023, he is now taking gabapentin 300 mg at bedtime and reports 20 to 30% reduction in numbness.  He reports that however some days, it is difficult for him to feel the ground and he has to walk very carefully and cautiously to prevent from falling.  He is using compounded cream also which helps some.  He reports extreme coldness involving both his feet as well.  No seizures since his last visit.  He is taking Tegretol 400 mg twice daily.    6/27/2024: He is in clinic for regular follow-up.  Since his last visit in February 2024, he has been taking gabapentin 300 mg twice daily.  He reports no change in numbness involving both his feet.  Especially in the evening hours and nighttime, it bothers him a lot.  He denies any side effects with gabapentin use.  He is seeing compounded cream to be applied locally and it does help with the symptoms.  No seizures since his last visit.  Tolerating Tegretol 400 mg twice daily well.    10/31/2024: He is in clinic for regular follow-up.  Since his last visit in June 2024, he is taking gabapentin 300 mg in the morning 300 mg at noon and 600 mg at night.  He does report some sleepiness with gabapentin use.  He reports that some days numbness is worse than the others.  No breakthrough seizures with Tegretol 400 mg twice a day and he also continues to use compounded cream to be applied locally.    The following portions of the patient's history were reviewed and updated as of 10/31/2024: allergies, social history, and problem list.       Current Outpatient Medications:     ascorbic acid (VITAMIN C) 1000 MG tablet, Take 1 tablet by mouth Daily., Disp: , Rfl:     carBAMazepine XR (TEGretol  XR) 400 MG 12 hr tablet, Take 1 tablet by mouth 2 (Two) Times a Day., Disp: , Rfl:     citalopram (CeleXA) 10 MG tablet, Take 1 tablet by mouth Daily., Disp: , Rfl:     clonazePAM (KlonoPIN) 0.5 MG tablet, Take 1  tablet by mouth 2 (Two) Times a Day., Disp: , Rfl:     Cyanocobalamin 1000 MCG capsule, cyanocobalamin (vitamin B-12) 1,000 mcg capsule  Take 1 capsule every day by oral route., Disp: , Rfl:     donepezil (ARICEPT) 10 MG tablet, Take 1 tablet by mouth Daily., Disp: , Rfl:     Folic Acid 20 MG capsule, Take  by mouth Daily., Disp: , Rfl:     gabapentin (NEURONTIN) 300 MG capsule, Take 1 capsule in the morning, 1 capsule at noon and 2 capsules at night (Patient taking differently: Take 1 capsule in the morning, 1 capsule at noon, 1 in the evening, 1 at night), Disp: 120 capsule, Rfl: 6    levothyroxine (SYNTHROID, LEVOTHROID) 50 MCG tablet, Take 1 tablet by mouth Daily., Disp: , Rfl:     Multiple Vitamins-Minerals (MULTIVITAMIN WITH MINERALS) tablet tablet, Take 1 tablet by mouth Daily., Disp: , Rfl:     simvastatin (ZOCOR) 40 MG tablet, Take 1 tablet by mouth Daily., Disp: , Rfl:     Iron Combinations (Iron Complex) capsule, Take 1 capsule by mouth Daily., Disp: , Rfl:    Past Medical History:   Diagnosis Date    High cholesterol     History of bradycardia     Hypertension     Peripheral neuropathy     Seizures     Shingles     SOB (shortness of breath) on exertion       Past Surgical History:   Procedure Laterality Date    BRAIN SURGERY  03/09/2007    VNS Implantation (Dr. Perez)     CARPAL TUNNEL RELEASE  11/09/2009    Left (Dr. Torres)    CARPAL TUNNEL RELEASE  12/02/2009    Right (Dr. Torres)    CATARACT EXTRACTION  10/16/2013    [facility]    CATARACT EXTRACTION      CERVICAL DISCECTOMY ANTERIOR  12/1989    Dr. Chavira    COLONOSCOPY      CORONARY ANGIOPLASTY  10/23/2013    Dr. Kent    CRANIOTOMY  05/07/2002    Right temporal lobectomy (Dr. Perez) [facility]    CRANIOTOMY  12/21/2010    Temporal lobectomy (Dr. Perez)     ELBOW PROCEDURE      KNEE CARTILAGE SURGERY      Right    LUMBAR DISCECTOMY  09/12/2008    Microendoscopic far lateral dickectomy Left L3-4 ([facility])    PACEMAKER IMPLANTATION  07/23/2013      "[facility]    POSTERIOR LUMBAR/THORACIC SPINE FUSION  10/24/2008    TLIF Left w percutaneous pedicle screw fixation L3/4 ([facility])    VAGUS NERVE STIMULATOR GENERATOR CHANGE  06/19/2012    (Dr. Perez) [facility]    VAGUS NERVE STIMULATOR IMPLANTATION      VAGUS NERVE STIMULATOR IMPLANTATION Left 02/16/2017    Procedure: VAGUS NERVE STIMULATOR IMPLANTATION;  Surgeon: Christiano Lopez MD;  [facility];  Service:     VAGUS NERVE STIMULATOR IMPLANTATION        No family history on file.     Review of Systems  Objective:    /82   Pulse 52   Ht 175.3 cm (69.02\")   SpO2 97%   BMI 30.11 kg/m²     Neurology Exam:  General apperance: NAD.     Mental status: Alert, awake and oriented to time place and person.    Language and Speech: No aphasia or dysarthria.    CN II to XII: Intact.    Opthalmoscopic Exam: No papilledema.    Motor:  Right UE muscle strength 5/5. Normal tone.     Left UE muscle strength 5/5. Normal tone.      Right LE muscle strength 5/5. Normal tone.     Left LE muscle strength 5/5. Normal tone.      Sensory: Normal light touch, vibration and pinprick sensation bilaterally.    DTRs: 2+ bilaterally.    Babinski: Negative bilaterally.    Co-ordination: Normal finger-to-nose, heel to shin B/L.    Rhomberg: Negative.    Gait: Normal.    Cardiovascular: Regular rate and rhythm without murmur, gallop or rub.    Assessment and Plan:  1. Idiopathic peripheral neuropathy  2. Other generalized epilepsy, not intractable, without status epilepticus  -I will be increasing the morning and afternoon dose of gabapentin to 400 mg and will continue with 600 mg dose at night.  Continue Tegretol 400 mg twice daily and continue with compounded cream for neuropathy as well.  I have advised him to call in case if he develops side effects with the higher dose of gabapentin otherwise I will see him back in clinic in 6 months for follow-up.       I spent 30 minutes in patient care: Reviewing records prior to the visit, entering " orders and documentation and spent more than koroma 50% of this time face-to-face in management, instructions and education regarding above mentioned diagnosis and also on counseling and discussing about taking medication regularly, possible side effects with medication use, importance of good sleep hygiene, good hydration and regular exercise.    Return in about 6 months (around 4/30/2025).       Note to patient: The 21st Century Cures Act makes medical notes like these available to patients in the interest of transparency. However, be advised this is a medical document. It is intended as peer to peer communication. It is written in medical language and may contain abbreviations or verbiage that are unfamiliar. It may appear blunt or direct. Medical documents are intended to carry relevant information, facts as evident, and the clinical opinion of the physician.

## 2024-11-04 ENCOUNTER — TELEPHONE (OUTPATIENT)
Dept: NEUROLOGY | Facility: CLINIC | Age: 74
End: 2024-11-04
Payer: MEDICARE

## 2024-11-04 NOTE — TELEPHONE ENCOUNTER
PATIENT IS TAKING CARBAMAZEPINE ER IS BEING CALLED IN BY DR PO ZHOU AT . PATIENT WANTED DR PLATA TO BE AWARE HE IS ALREADY TAKING THIS MEDICATION AND SAME DOSAGE. HE WANTS TO MAKE SURE THAT BOTH PROVIDERS ARE SPEAKING WITH EACH OTHER AND ARE ON THE SAME PAGE WITH THE RX. HE PREFERS THAT THIS MEDICATION BE SENT IN BY DR ZHOU.    REQUESTING A CALL-DOES NOT WANT TO DOUBLE UP ON THIS MEDICATION.

## 2024-11-04 NOTE — TELEPHONE ENCOUNTER
Caller: Harsha Delaney    Relationship: Self    Best call back number: [phone]    What was the call regarding: PT CALLED BACK TO CHECK FOR AN UPDATE. I ADVISED PT THAT YONI HAS SENT MESSAGE BACK TO CLINICAL STAFF TO BE REVIEWED BY DR. PLATA. PT HAS BEEN ADVISED THAT MEDICAL ASSISTANT WILL REACH OUT ONCE CLARIFICATION HAS BEEN PROVIDED BY DR. PLATA.    PT REITERATES THAT HE WOULD PREFER TO RX TO BE FILLED BY DR. SHARMA.    Do you require a callback: YES, PLEASE.    PLEASE REVIEW AND ADVISE.

## 2024-11-05 RX ORDER — CARBAMAZEPINE 400 MG/1
400 TABLET, EXTENDED RELEASE ORAL 2 TIMES DAILY
COMMUNITY

## 2024-11-05 NOTE — TELEPHONE ENCOUNTER
"Called and spoke with Harsha earlier, advised rx was sent after his appt essentially \"just because\" and Dr. Sepulveda is aware he is taking, our records show that as well, he just wanted to be sure the pharmacy wasn't receiving rxs from two places it seems like. He was very grateful for the call back. I faxed [facility] to d/c their rx as well to save from any future fills/refill requests to Dr. Sepulveda.    GRACE Finn     "

## 2024-11-20 ENCOUNTER — TELEPHONE (OUTPATIENT)
Dept: NEUROLOGY | Facility: CLINIC | Age: 74
End: 2024-11-20
Payer: MEDICARE

## 2024-11-20 NOTE — TELEPHONE ENCOUNTER
Caller: Rhett Harsha BAETRIZ    Relationship: Self    Best call back number: [phone] (home)     Which medication are you concerned about: GABAPENTIN    Who prescribed you this medication: DR PLATA    When did you start taking this medication: 1 MONTH AGO    What are your concerns: PT IS HAVING NIGHT TERRORS AND WANTS TO STOP TAKING THE MEDICATION. HE WANTS TO KNOW IF THERE ARE ANY INSTRUCTIONS ON STOPPING MEDICATION.     PT STATED HE WANTS TO GET BACK TO THE WAY THINGS WERE BEFORE AND ASKS IF HE CAN GO BACK TO HIS CLONAZEPAM.     PLEASE ADVISE  THANK YOU

## 2024-11-22 NOTE — TELEPHONE ENCOUNTER
Caller: Harsha Delaney    Relationship: Self    Best call back number: [phone]  What was the call regarding: PATIENT IS TRYING TO FIND OUT WHAT HE CAN DO REGARDING HIS MEDICATIONS AND WANTS A CALL BACK.

## 2024-11-22 NOTE — TELEPHONE ENCOUNTER
Caller: Harsha Delaney    Relationship: Self    Best call back number: [phone]    What is the best time to reach you: ANYTIME    Who are you requesting to speak with (clinical staff, provider,  specific staff member): UNSURE    Do you know the name of the person who called: UNSURE    What was the call regarding: PATIENT CALLED DUE TO A MISSED A CALL ON HIS PHONE THIS AM AND IS CHECKING IN TO SEE IF THERE IS AN UPDATE FOR THE MEDICATIONS.     PLEASE ADVISE  THANK YOU    negative

## 2024-11-25 NOTE — TELEPHONE ENCOUNTER
Name: Harsha Delaney    Relationship: Self    Best Callback Number: [phone]    HUB PROVIDED THE RELAY MESSAGE FROM THE OFFICE   PATIENT VOICED UNDERSTANDING AND HAS NO FURTHER QUESTIONS AT THIS TIME

## 2024-11-25 NOTE — TELEPHONE ENCOUNTER
"Relay     \"LVM with Harsha, per Dr. Derick IBRAHIM to stop gabapentin, reduce it to 400 mg twice a day for 1 week then 400 mg once a day for 1 week then stop. I am not sure who called him before either.     GRACE Finn \"                  "

## 2025-02-14 ENCOUNTER — TELEPHONE (OUTPATIENT)
Dept: NEUROLOGY | Facility: CLINIC | Age: 75
End: 2025-02-14

## 2025-02-14 NOTE — TELEPHONE ENCOUNTER
Caller: Harsha Delaney    Relationship: Self    Best call back number: [phone]    Requested Prescriptions: COMPOUNDING CREAM      Pharmacy where request should be sent: [facility] - [place] [address] - [phone]  - [phone] FX     Last office visit with prescribing clinician: 10/31/2024   Last telemedicine visit with prescribing clinician: Visit date not found   Next office visit with prescribing clinician: 5/1/2025     Additional details provided by patient: OUT    Does the patient have less than a 3 day supply:  [x] Yes  [] No    Would you like a call back once the refill request has been completed: [] Yes [x] No    If the office needs to give you a call back, can they leave a voicemail: [] Yes [x] No    Brandon Roberson Rep   02/14/25 11:42 EST

## (undated) DEVICE — SUT SILK 2/0 TIES 18IN A185H

## (undated) DEVICE — FOGARTY - HYDRAGRIP SURGICAL - CLAMP INSERTS: Brand: FOGARTY SOFTJAW

## (undated) DEVICE — APPL CHLORAPREP W/TINT 26ML BLU

## (undated) DEVICE — MAGNETIC DRAPE: Brand: DEVON

## (undated) DEVICE — SUT VIC PLS CTD ANTIB BR 3/0 8/18IN 45CM

## (undated) DEVICE — PK NEURO DISC 10

## (undated) DEVICE — COVADERM PLUS: Brand: DEROYAL

## (undated) DEVICE — NEURO SPONGES: Brand: DEROYAL

## (undated) DEVICE — AIRWY SZ11

## (undated) DEVICE — COVER,MAYO STAND,STERILE: Brand: MEDLINE

## (undated) DEVICE — SUT SILK 3/0 TIES 18IN A184H

## (undated) DEVICE — SUT SILK 4/0 TIES 18IN A183H

## (undated) DEVICE — SUT PROLN 5/0 C1 D/A 36IN 8720H

## (undated) DEVICE — NDL HYPO SFTY PROEDGE 27G 1 1/4IN GRY

## (undated) DEVICE — ENCORE® LATEX MICRO SIZE 7.5, STERILE LATEX POWDER-FREE SURGICAL GLOVE: Brand: ENCORE

## (undated) DEVICE — PROVIDES A STERILE INTERFACE BETWEEN THE OPERATING ROOM SURGICAL LAMPS (NON-STERILE) AND THE SURGEON OR NURSE (STERILE).: Brand: STERION®CLAMP COVER FABRIC

## (undated) DEVICE — CATH URETH NELATON STR 20F

## (undated) DEVICE — TOWEL,OR,DSP,ST,WHITE,DLX,XR,4/PK,20PK/C: Brand: MEDLINE

## (undated) DEVICE — ENCORE® LATEX MICRO SIZE 6.5, STERILE LATEX POWDER-FREE SURGICAL GLOVE: Brand: ENCORE

## (undated) DEVICE — MEDI-VAC YANKAUER SUCTION HANDLE W/BULBOUS TIP: Brand: CARDINAL HEALTH

## (undated) DEVICE — MEDI-VAC NON-CONDUCTIVE SUCTION TUBING: Brand: CARDINAL HEALTH

## (undated) DEVICE — 3M™ STERI-STRIP™ REINFORCED ADHESIVE SKIN CLOSURES, R1547, 1/2 IN X 4 IN (12 MM X 100 MM), 6 STRIPS/ENVELOPE: Brand: 3M™ STERI-STRIP™

## (undated) DEVICE — ENCORE® LATEX MICRO SIZE 7, STERILE LATEX POWDER-FREE SURGICAL GLOVE: Brand: ENCORE

## (undated) DEVICE — DECANT BG O JET

## (undated) DEVICE — ADHS LIQ MASTISOL 2/3ML

## (undated) DEVICE — SKIN AFFIX SURG ADHESIVE 72/CS 0.55ML: Brand: MEDLINE

## (undated) DEVICE — CANNULA,ADULT,SOFT-TOUCH,7TUBE,SC: Brand: MEDLINE

## (undated) DEVICE — INTENDED TO BE USED TO OCCLUDE, RETRACT AND IDENTIFY ARTERIES, VEINS, TENDONS AND NERVES IN SURGICAL PROCEDURES: Brand: STERION®  VESSEL LOOP

## (undated) DEVICE — SUT PROLN 6/0 C1 CARDIO 18IN 8718H

## (undated) DEVICE — SOL LR 1000ML

## (undated) DEVICE — CAMERA/LASER ARM DRAPE: Brand: DEROYAL